# Patient Record
Sex: FEMALE | Race: WHITE | NOT HISPANIC OR LATINO | Employment: OTHER | URBAN - METROPOLITAN AREA
[De-identification: names, ages, dates, MRNs, and addresses within clinical notes are randomized per-mention and may not be internally consistent; named-entity substitution may affect disease eponyms.]

---

## 2021-09-23 ENCOUNTER — OFFICE VISIT (OUTPATIENT)
Dept: ENDOCRINOLOGY | Facility: CLINIC | Age: 66
End: 2021-09-23
Payer: MEDICARE

## 2021-09-23 ENCOUNTER — HOSPITAL ENCOUNTER (OUTPATIENT)
Dept: RADIOLOGY | Facility: CLINIC | Age: 66
Discharge: HOME | End: 2021-09-23
Attending: PHYSICIAN ASSISTANT
Payer: MEDICARE

## 2021-09-23 VITALS
BODY MASS INDEX: 37.34 KG/M2 | HEIGHT: 63 IN | WEIGHT: 210.75 LBS | DIASTOLIC BLOOD PRESSURE: 80 MMHG | OXYGEN SATURATION: 96 % | SYSTOLIC BLOOD PRESSURE: 124 MMHG | HEART RATE: 90 BPM | TEMPERATURE: 98 F

## 2021-09-23 DIAGNOSIS — E55.9 HYPOVITAMINOSIS D: ICD-10-CM

## 2021-09-23 DIAGNOSIS — L71.9 ROSACEA: ICD-10-CM

## 2021-09-23 DIAGNOSIS — Z78.0 POSTMENOPAUSAL: ICD-10-CM

## 2021-09-23 DIAGNOSIS — E66.9 OBESITY (BMI 35.0-39.9 WITHOUT COMORBIDITY): ICD-10-CM

## 2021-09-23 DIAGNOSIS — R53.83 FATIGUE, UNSPECIFIED TYPE: ICD-10-CM

## 2021-09-23 DIAGNOSIS — L67.8 ABNORMAL FACIAL HAIR: ICD-10-CM

## 2021-09-23 DIAGNOSIS — E65 BUFFALO HUMP: ICD-10-CM

## 2021-09-23 DIAGNOSIS — Z87.42 HISTORY OF PCOS: Primary | ICD-10-CM

## 2021-09-23 PROCEDURE — 77080 DXA BONE DENSITY AXIAL: CPT | Mod: 26,,, | Performed by: RADIOLOGY

## 2021-09-23 PROCEDURE — 99203 PR OFFICE/OUTPT VISIT, NEW, LEVL III, 30-44 MIN: ICD-10-PCS | Mod: S$PBB,,, | Performed by: PHYSICIAN ASSISTANT

## 2021-09-23 PROCEDURE — 99203 OFFICE O/P NEW LOW 30 MIN: CPT | Mod: PBBFAC,25,PO | Performed by: PHYSICIAN ASSISTANT

## 2021-09-23 PROCEDURE — 77080 DXA BONE DENSITY AXIAL: CPT | Mod: TC,PO

## 2021-09-23 PROCEDURE — 99999 PR PBB SHADOW E&M-NEW PATIENT-LVL III: CPT | Mod: PBBFAC,,, | Performed by: PHYSICIAN ASSISTANT

## 2021-09-23 PROCEDURE — 99999 PR PBB SHADOW E&M-NEW PATIENT-LVL III: ICD-10-PCS | Mod: PBBFAC,,, | Performed by: PHYSICIAN ASSISTANT

## 2021-09-23 PROCEDURE — 99203 OFFICE O/P NEW LOW 30 MIN: CPT | Mod: S$PBB,,, | Performed by: PHYSICIAN ASSISTANT

## 2021-09-23 PROCEDURE — 77080 DEXA BONE DENSITY SPINE HIP: ICD-10-PCS | Mod: 26,,, | Performed by: RADIOLOGY

## 2021-09-23 RX ORDER — BECLOMETHASONE DIPROPIONATE 80 UG/1
2 AEROSOL, METERED NASAL
COMMUNITY
Start: 2015-05-14 | End: 2021-11-24

## 2021-09-24 ENCOUNTER — LAB VISIT (OUTPATIENT)
Dept: LAB | Facility: HOSPITAL | Age: 66
End: 2021-09-24
Attending: PHYSICIAN ASSISTANT
Payer: MEDICARE

## 2021-09-24 ENCOUNTER — TELEPHONE (OUTPATIENT)
Dept: FAMILY MEDICINE | Facility: CLINIC | Age: 66
End: 2021-09-24

## 2021-09-24 DIAGNOSIS — E55.9 HYPOVITAMINOSIS D: ICD-10-CM

## 2021-09-24 DIAGNOSIS — E66.9 OBESITY (BMI 35.0-39.9 WITHOUT COMORBIDITY): ICD-10-CM

## 2021-09-24 DIAGNOSIS — R53.83 FATIGUE, UNSPECIFIED TYPE: ICD-10-CM

## 2021-09-24 DIAGNOSIS — Z83.3 FAMILY HISTORY OF DIABETES MELLITUS: ICD-10-CM

## 2021-09-24 DIAGNOSIS — Z87.42 HISTORY OF PCOS: ICD-10-CM

## 2021-09-24 DIAGNOSIS — E65 BUFFALO HUMP: ICD-10-CM

## 2021-09-24 DIAGNOSIS — L67.8 ABNORMAL FACIAL HAIR: ICD-10-CM

## 2021-09-24 LAB
25(OH)D3+25(OH)D2 SERPL-MCNC: 26 NG/ML (ref 30–96)
ALBUMIN SERPL BCP-MCNC: 4 G/DL (ref 3.5–5.2)
ALP SERPL-CCNC: 79 U/L (ref 55–135)
ALT SERPL W/O P-5'-P-CCNC: 15 U/L (ref 10–44)
ANION GAP SERPL CALC-SCNC: 13 MMOL/L (ref 8–16)
AST SERPL-CCNC: 16 U/L (ref 10–40)
BILIRUB SERPL-MCNC: 1.2 MG/DL (ref 0.1–1)
BUN SERPL-MCNC: 14 MG/DL (ref 8–23)
CALCIUM SERPL-MCNC: 10.5 MG/DL (ref 8.7–10.5)
CHLORIDE SERPL-SCNC: 103 MMOL/L (ref 95–110)
CHOLEST SERPL-MCNC: 153 MG/DL (ref 120–199)
CHOLEST/HDLC SERPL: 2.9 {RATIO} (ref 2–5)
CO2 SERPL-SCNC: 23 MMOL/L (ref 23–29)
CORTIS SERPL-MCNC: 13.1 UG/DL
CREAT SERPL-MCNC: 0.7 MG/DL (ref 0.5–1.4)
DHEA-S SERPL-MCNC: 39.9 UG/DL (ref 33.6–78.9)
EST. GFR  (AFRICAN AMERICAN): >60 ML/MIN/1.73 M^2
EST. GFR  (NON AFRICAN AMERICAN): >60 ML/MIN/1.73 M^2
GLUCOSE SERPL-MCNC: 126 MG/DL (ref 70–110)
HDLC SERPL-MCNC: 52 MG/DL (ref 40–75)
HDLC SERPL: 34 % (ref 20–50)
LDLC SERPL CALC-MCNC: 85.4 MG/DL (ref 63–159)
NONHDLC SERPL-MCNC: 101 MG/DL
POTASSIUM SERPL-SCNC: 4.1 MMOL/L (ref 3.5–5.1)
PROGEST SERPL-MCNC: 0.9 NG/ML
PROT SERPL-MCNC: 7.6 G/DL (ref 6–8.4)
SODIUM SERPL-SCNC: 139 MMOL/L (ref 136–145)
T4 FREE SERPL-MCNC: 0.84 NG/DL (ref 0.71–1.51)
TRIGL SERPL-MCNC: 78 MG/DL (ref 30–150)
TSH SERPL DL<=0.005 MIU/L-ACNC: 2.36 UIU/ML (ref 0.4–4)

## 2021-09-24 PROCEDURE — 82024 ASSAY OF ACTH: CPT | Performed by: PHYSICIAN ASSISTANT

## 2021-09-24 PROCEDURE — 84443 ASSAY THYROID STIM HORMONE: CPT | Performed by: PHYSICIAN ASSISTANT

## 2021-09-24 PROCEDURE — 80053 COMPREHEN METABOLIC PANEL: CPT | Performed by: PHYSICIAN ASSISTANT

## 2021-09-24 PROCEDURE — 83498 ASY HYDROXYPROGESTERONE 17-D: CPT | Performed by: PHYSICIAN ASSISTANT

## 2021-09-24 PROCEDURE — 82306 VITAMIN D 25 HYDROXY: CPT | Performed by: PHYSICIAN ASSISTANT

## 2021-09-24 PROCEDURE — 84439 ASSAY OF FREE THYROXINE: CPT | Performed by: PHYSICIAN ASSISTANT

## 2021-09-24 PROCEDURE — 84403 ASSAY OF TOTAL TESTOSTERONE: CPT | Performed by: PHYSICIAN ASSISTANT

## 2021-09-24 PROCEDURE — 36415 COLL VENOUS BLD VENIPUNCTURE: CPT | Mod: PO | Performed by: PHYSICIAN ASSISTANT

## 2021-09-24 PROCEDURE — 80061 LIPID PANEL: CPT | Performed by: PHYSICIAN ASSISTANT

## 2021-09-24 PROCEDURE — 82627 DEHYDROEPIANDROSTERONE: CPT | Performed by: PHYSICIAN ASSISTANT

## 2021-09-24 PROCEDURE — 84144 ASSAY OF PROGESTERONE: CPT | Performed by: PHYSICIAN ASSISTANT

## 2021-09-24 PROCEDURE — 82533 TOTAL CORTISOL: CPT | Performed by: PHYSICIAN ASSISTANT

## 2021-09-28 ENCOUNTER — TELEPHONE (OUTPATIENT)
Dept: FAMILY MEDICINE | Facility: CLINIC | Age: 66
End: 2021-09-28

## 2021-09-28 LAB — ACTH PLAS-MCNC: 18 PG/ML (ref 0–46)

## 2021-09-29 LAB
ESTRADIOL SERPL-MCNC: 19 PG/ML
ESTRONE SERPL-MCNC: 57 PG/ML

## 2021-09-30 LAB
17OHP SERPL-MCNC: 90 NG/DL (ref 32–272)
ALBUMIN SERPL-MCNC: 4.3 G/DL (ref 3.6–5.1)
SHBG SERPL-SCNC: 58 NMOL/L (ref 14–73)
TESTOST FREE SERPL-MCNC: 6.1 PG/ML (ref 0.2–5)
TESTOST SERPL-MCNC: 81 NG/DL (ref 2–45)
TESTOSTERONE.FREE+WB SERPL-MCNC: 12 NG/DL (ref 0.5–8.5)

## 2021-10-07 ENCOUNTER — PATIENT MESSAGE (OUTPATIENT)
Dept: ENDOCRINOLOGY | Facility: CLINIC | Age: 66
End: 2021-10-07

## 2021-10-07 DIAGNOSIS — R79.89 ELEVATED TESTOSTERONE LEVEL: Primary | ICD-10-CM

## 2021-10-24 ENCOUNTER — PATIENT MESSAGE (OUTPATIENT)
Dept: ENDOCRINOLOGY | Facility: CLINIC | Age: 66
End: 2021-10-24
Payer: MEDICARE

## 2021-10-25 DIAGNOSIS — R53.83 FATIGUE, UNSPECIFIED TYPE: Primary | ICD-10-CM

## 2021-10-25 DIAGNOSIS — R73.9 HYPERGLYCEMIA: Primary | ICD-10-CM

## 2021-11-18 ENCOUNTER — LAB VISIT (OUTPATIENT)
Dept: LAB | Facility: HOSPITAL | Age: 66
End: 2021-11-18
Attending: PHYSICIAN ASSISTANT
Payer: MEDICARE

## 2021-11-18 DIAGNOSIS — E55.9 HYPOVITAMINOSIS D: ICD-10-CM

## 2021-11-18 DIAGNOSIS — R73.9 HYPERGLYCEMIA: ICD-10-CM

## 2021-11-18 DIAGNOSIS — R79.89 ELEVATED TESTOSTERONE LEVEL: ICD-10-CM

## 2021-11-18 LAB
25(OH)D3+25(OH)D2 SERPL-MCNC: 59 NG/ML (ref 30–96)
ESTIMATED AVG GLUCOSE: 120 MG/DL (ref 68–131)
HBA1C MFR BLD: 5.8 % (ref 4–5.6)
PROGEST SERPL-MCNC: 0.2 NG/ML

## 2021-11-18 PROCEDURE — 83036 HEMOGLOBIN GLYCOSYLATED A1C: CPT | Performed by: PHYSICIAN ASSISTANT

## 2021-11-18 PROCEDURE — 84403 ASSAY OF TOTAL TESTOSTERONE: CPT | Performed by: PHYSICIAN ASSISTANT

## 2021-11-18 PROCEDURE — 84144 ASSAY OF PROGESTERONE: CPT | Performed by: PHYSICIAN ASSISTANT

## 2021-11-18 PROCEDURE — 82306 VITAMIN D 25 HYDROXY: CPT | Performed by: PHYSICIAN ASSISTANT

## 2021-11-18 PROCEDURE — 36415 COLL VENOUS BLD VENIPUNCTURE: CPT | Mod: PO | Performed by: PHYSICIAN ASSISTANT

## 2021-11-24 ENCOUNTER — OFFICE VISIT (OUTPATIENT)
Dept: DERMATOLOGY | Facility: CLINIC | Age: 66
End: 2021-11-24
Payer: MEDICARE

## 2021-11-24 DIAGNOSIS — L71.9 ROSACEA: ICD-10-CM

## 2021-11-24 DIAGNOSIS — L73.8 SEBACEOUS HYPERPLASIA: Primary | ICD-10-CM

## 2021-11-24 PROCEDURE — 99999 PR PBB SHADOW E&M-EST. PATIENT-LVL III: ICD-10-PCS | Mod: PBBFAC,,, | Performed by: STUDENT IN AN ORGANIZED HEALTH CARE EDUCATION/TRAINING PROGRAM

## 2021-11-24 PROCEDURE — 99204 PR OFFICE/OUTPT VISIT, NEW, LEVL IV, 45-59 MIN: ICD-10-PCS | Mod: S$PBB,,, | Performed by: STUDENT IN AN ORGANIZED HEALTH CARE EDUCATION/TRAINING PROGRAM

## 2021-11-24 PROCEDURE — 99999 PR PBB SHADOW E&M-EST. PATIENT-LVL III: CPT | Mod: PBBFAC,,, | Performed by: STUDENT IN AN ORGANIZED HEALTH CARE EDUCATION/TRAINING PROGRAM

## 2021-11-24 PROCEDURE — 99213 OFFICE O/P EST LOW 20 MIN: CPT | Mod: PBBFAC,PO | Performed by: STUDENT IN AN ORGANIZED HEALTH CARE EDUCATION/TRAINING PROGRAM

## 2021-11-24 PROCEDURE — 99204 OFFICE O/P NEW MOD 45 MIN: CPT | Mod: S$PBB,,, | Performed by: STUDENT IN AN ORGANIZED HEALTH CARE EDUCATION/TRAINING PROGRAM

## 2021-11-24 RX ORDER — AZELAIC ACID 0.15 G/G
GEL TOPICAL
Qty: 50 G | Refills: 3 | Status: SHIPPED | OUTPATIENT
Start: 2021-11-24 | End: 2023-03-09 | Stop reason: ALTCHOICE

## 2021-11-25 LAB
ALBUMIN SERPL-MCNC: 4.4 G/DL (ref 3.6–5.1)
SHBG SERPL-SCNC: 47 NMOL/L (ref 14–73)
TESTOST FREE SERPL-MCNC: 4.1 PG/ML (ref 0.2–5)
TESTOST SERPL-MCNC: 46 NG/DL (ref 2–45)
TESTOSTERONE.FREE+WB SERPL-MCNC: 8.2 NG/DL (ref 0.5–8.5)

## 2021-12-01 ENCOUNTER — PATIENT MESSAGE (OUTPATIENT)
Dept: DERMATOLOGY | Facility: CLINIC | Age: 66
End: 2021-12-01
Payer: MEDICARE

## 2021-12-07 DIAGNOSIS — R53.83 FATIGUE, UNSPECIFIED TYPE: Primary | ICD-10-CM

## 2022-04-19 ENCOUNTER — LAB VISIT (OUTPATIENT)
Dept: LAB | Facility: HOSPITAL | Age: 67
End: 2022-04-19
Attending: DERMATOLOGY
Payer: MEDICARE

## 2022-04-19 DIAGNOSIS — Z67.91 UNSPECIFIED BLOOD TYPE, RH NEGATIVE: ICD-10-CM

## 2022-04-19 DIAGNOSIS — Z79.899 ENCOUNTER FOR LONG-TERM (CURRENT) USE OF HIGH-RISK MEDICATION: ICD-10-CM

## 2022-04-19 LAB — ABO + RH BLD: NORMAL

## 2022-04-19 PROCEDURE — 86901 BLOOD TYPING SEROLOGIC RH(D): CPT | Performed by: DERMATOLOGY

## 2022-04-19 PROCEDURE — 36415 COLL VENOUS BLD VENIPUNCTURE: CPT | Mod: PO | Performed by: DERMATOLOGY

## 2022-05-16 ENCOUNTER — OFFICE VISIT (OUTPATIENT)
Dept: OTOLARYNGOLOGY | Facility: CLINIC | Age: 67
End: 2022-05-16
Payer: MEDICARE

## 2022-05-16 VITALS — TEMPERATURE: 99 F | WEIGHT: 203.06 LBS | BODY MASS INDEX: 35.98 KG/M2 | HEIGHT: 63 IN

## 2022-05-16 DIAGNOSIS — J34.3 HYPERTROPHY OF BOTH INFERIOR NASAL TURBINATES: ICD-10-CM

## 2022-05-16 DIAGNOSIS — J34.89 NASAL OBSTRUCTION: Primary | ICD-10-CM

## 2022-05-16 DIAGNOSIS — J34.2 NASAL SEPTAL DEVIATION: ICD-10-CM

## 2022-05-16 DIAGNOSIS — M95.0 NASAL VALVE COLLAPSE: ICD-10-CM

## 2022-05-16 DIAGNOSIS — R44.2 PHANTOSMIA: ICD-10-CM

## 2022-05-16 PROCEDURE — 99999 PR PBB SHADOW E&M-EST. PATIENT-LVL III: ICD-10-PCS | Mod: PBBFAC,,, | Performed by: PHYSICIAN ASSISTANT

## 2022-05-16 PROCEDURE — 99203 PR OFFICE/OUTPT VISIT, NEW, LEVL III, 30-44 MIN: ICD-10-PCS | Mod: S$PBB,25,, | Performed by: PHYSICIAN ASSISTANT

## 2022-05-16 PROCEDURE — 99213 OFFICE O/P EST LOW 20 MIN: CPT | Mod: PBBFAC,PO,25 | Performed by: PHYSICIAN ASSISTANT

## 2022-05-16 PROCEDURE — 31231 NASAL ENDOSCOPY DX: CPT | Mod: S$PBB,,, | Performed by: PHYSICIAN ASSISTANT

## 2022-05-16 PROCEDURE — 99203 OFFICE O/P NEW LOW 30 MIN: CPT | Mod: S$PBB,25,, | Performed by: PHYSICIAN ASSISTANT

## 2022-05-16 PROCEDURE — 31231 NASAL/SINUS ENDOSCOPY: ICD-10-PCS | Mod: S$PBB,,, | Performed by: PHYSICIAN ASSISTANT

## 2022-05-16 PROCEDURE — 31231 NASAL ENDOSCOPY DX: CPT | Mod: PBBFAC,PO | Performed by: PHYSICIAN ASSISTANT

## 2022-05-16 PROCEDURE — 99999 PR PBB SHADOW E&M-EST. PATIENT-LVL III: CPT | Mod: PBBFAC,,, | Performed by: PHYSICIAN ASSISTANT

## 2022-05-16 RX ORDER — ALPHA LIPOIC ACID 300 MG
300 CAPSULE ORAL 2 TIMES DAILY
Qty: 180 EACH | Refills: 0 | Status: SHIPPED | OUTPATIENT
Start: 2022-05-16 | End: 2022-08-14

## 2022-05-16 NOTE — PATIENT INSTRUCTIONS
Use NeilMed sinus rinse two times daily.          DIRECTIONS FOR SINUS SALINE RINSE To see demonstration: Enter http://www.youOrlumetube.com/watch?v=[email or web address] into the browser address box, or go to You tube, and under the search box, enter sinus rinse. Click on NeilMed Sinus Rinse Video    Step 1    Step 1 Please wash your hands. Fill the clean bottle with the designated volume of warm distilled water, filtered water or previously boiled water. You may warm the water in a microwave but we recommend that you warm it in increments of five seconds. This is to avoid excessive heating of the water and damage to the device or scalding your nasal passage.    Step 2    Step 2 Cut the SINUS RINSE mixture packet at the corner and pour its contents into the bottle. Tighten the cap & tube on the bottle securely, place one finger over the tip of the cap and shake the bottle gently to dissolve the mixture.      Step 3    Step 3 Standing in front of a sink, bend forward to your comfort level and tilt your head down. Keeping your mouth open without holding your breath, place cap snugly against your nasal passage and SQUEEZE BOTTLE GENTLY until the solution starts draining from the OPPOSITE nasal passage or from your mouth. Keep squeezing the bottle GENTLY until at least 1/4 to 1/2 (60 to 120 mL) of the bottle is used for a proper rinse. Do not swallow the solution.    Step 4    Blow your nose gently, without pinching your nose completely because this will apply pressure on the eardrums. If tolerable, sniff in any residual solution remaining in the nasal passage once or twice prior to blowing your nose as this may clean out the posterior nasopharyngeal area (the area at the back of your nasal passage). Some solution will reach the back of the throat, so please spit it out. To help improve drainage of any residual solution, blow your nose gently while tilting your head to the opposite side of the nasal passage that you just  rinsed.    Step 5    Now repeat steps 3 & 4 for your other nasal passage.    Step 6     Air dry the Sinus Rinse bottle, cap, and tube on a clean paper towel, a glass plate to store the bottle cap and tube. If there is any solution leftover, please discard it. We recommend you make a fresh solution each time you rinse. Rinse 5 times each day, OR as directed by your physician. Warnings: DO NOT RINSE IF NASAL PASSAGE IS COMPLETELY BLOCKED OR IF YOU HAVE AN EAR INFECTION OR BLOCKED EARS. If you have had ear surgery, please contact your physician prior to irrigation. If you experience any pressure in your ears, stop the rinse and get further directions from your physician or contact our office during regular business hours. To avoid any ear discomfort: Heat the solution to lukewarm, do not use hot, boiling or cold water. Keep your mouth open. Do not hold your breath and if possible make the sound SANCHEZ....SANCHEZ... Make sure to take the position as shown. Gently squeeze 1/4 of the bottle at a time (60mL / 2 ounces). Stop the rinse if you feel any solution sensation near the ears. You may rinse with a partially blocked nasal passage. Please do not use for any other purposes. Please rinse at least ONE HOUR PRIOR to bedtime, in order to avoid any residual solution dripping in the throat.    >> Before using the SINUS RINSE kit, please inspect the cap, tube and bottle carefully for wear and tear. If any of the components appear discolored or cracked, please contact [facility] to obtain a replacement. You must follow the cleaning instructions provided in this brochure or cleaning instruction card prior to each use.    >> The SINUS RINSE bottle and mixture are to be used only for nasalirrigation. Do not use for any other purposes.    >> We recommend that you use the rinse ONE HOUR PRIOR to bedtime in order to avoid any residual solution dripping in the throat.    Tips to avoid ear discomfort while rinsing    If you have had ear  surgery, please contact your physician prior to irrigation. Do not use if you have an ear infection or blocked ears. Rinse with lukewarm water. Keep your mouth open. Do not hold your breath while rinsing. While rinsing, make sure to tilt your. Gently squeeze the bottle while rinsing; do not squeeze the bottle very forcefully. Stop the rinse if you feel a sensation of fluid near your ears.    Tips to avoid unexpected drainage after rinsing    In rare situations, especially if you have had sinus surgery, the saline solution can pool in the sinus cavities and nasal passages and then drip from your nostrils hours after rinsing. To avoid this harmless but annoying inconvenience, take one extra step after rinsing: lean forward, tilt your head sideways and gently blow your nose. Then, tilt your head to the other side and blow again. You may need to repeat this several times. This will help rid your nasal passages of any excess mucus and remaining saline solution. If you find yourself experiencing delayed drainage often, do not rinse right before leaving your house or going to bed.

## 2022-05-16 NOTE — PROGRESS NOTES
[facility]    Subjective:      Patient: Keshia Son Patient PCP: Primary Doctor No         :  1955     Sex:  female      MRN:  [id number]          Date of Visit: 2022      Chief Complaint: Nasal congestion    Patient ID: Keshia Son is a 66 y.o. female who was self-referred for sinus issues. Pt states she has history of environmental allergies, but does not use oral antihistamine due to them making her drowsy. Pt states she does have chronic nasal obstructive symptoms. Pt endorses h/o hyposmia for over a year which has been helped with treatment of vitamin D deficiency. Pt does state that she has occasional phantosmia with smell of ammonia/chemical smell. Pt states that this is intermittent and happens when she lays down-pt states that this happens daily. Pt states that her main complaint today is nasal congestion. Pt denies fever/chills, PND, rhinorrhea, or sinus pain/pressure. Pt states that she is not currently on sinus medications. She does not recall previously having allergy testing. She denies a history of asthma. She denies a diagnosis of obstructive sleep apnea. She has not had sinonasal surgery. She has not had a tonsillectomy. She does not recall a prior history of nasal trauma. Pt deneis memory issues or gait instability. Pt denies h/o alzheimer's or parkinson's disease.     Review of Systems   Constitutional: Negative for chills and fever.   HENT: Positive for congestion. Negative for postnasal drip, rhinorrhea, sinus pressure and sinus pain.         Smell changes      Past Medical History  She has a past medical history of Allergies and Polycystic ovaries.    Family History  Her family history includes Stroke in her mother.    Past Surgical History:   Procedure Laterality Date    BREAST REVISION SURGERY  2021    GASTRIC RESTRICTION SURGERY      [place]    REDUCTION OF BOTH BREASTS      THIGH LIFT          TOTAL BODY LIFT      2007    tummy tuck  2021      Social History     Tobacco Use    Smoking status: Never Smoker    Smokeless tobacco: Never Used   Substance and Sexual Activity    Alcohol use: Yes     Alcohol/week: 2.0 standard drinks     Types: 2 Glasses of wine per week     Comment: holidays    Drug use: Never    Sexual activity: Not Currently     Medications  She has a current medication list which includes the following prescription(s): alpha lipoic acid, azelaic acid, cefuroxime, finasteride, hydrocodone-acetaminophen, and spironolactone.    Review of patient's allergies indicates:  No Known Allergies  All medications, allergies, and past history have been reviewed.    Objective:      Vitals:  Vitals - 1 value per visit 11/24/2021 5/16/2022 5/16/2022   SYSTOLIC - - -   DIASTOLIC - - -   Pulse - - -   Temp - - 98.5   SPO2 - - -   Weight (lb) - - 203.04   Weight (kg) - - 92.1   Height - - 63   BMI (Calculated) - - 36   VISIT REPORT - - -   Pain Score  0 0 -       Body surface area is 2.02 meters squared.  Physical Exam  Constitutional:       General: She is not in acute distress.     Appearance: Normal appearance. She is not ill-appearing.   HENT:      Head: Normocephalic and atraumatic.      Right Ear: Tympanic membrane, ear canal and external ear normal.      Left Ear: Tympanic membrane, ear canal and external ear normal.      Nose: Septal deviation (right) present.      Right Turbinates: Enlarged.      Left Turbinates: Enlarged.      Right Sinus: No maxillary sinus tenderness or frontal sinus tenderness.      Left Sinus: No maxillary sinus tenderness or frontal sinus tenderness.      Mouth/Throat:      Lips: Pink. No lesions.      Mouth: Mucous membranes are moist. No oral lesions.      Tongue: No lesions.      Palate: No lesions.      Pharynx: Oropharynx is clear. Uvula midline. No pharyngeal swelling, oropharyngeal exudate, posterior oropharyngeal erythema or uvula swelling.   Eyes:      General:         Right eye: No discharge.         Left eye:  No discharge.      Extraocular Movements: Extraocular movements intact.      Conjunctiva/sclera: Conjunctivae normal.   Pulmonary:      Effort: Pulmonary effort is normal.   Neurological:      General: No focal deficit present.      Mental Status: She is alert and oriented to person, place, and time. Mental status is at baseline.   Psychiatric:         Mood and Affect: Mood normal.         Behavior: Behavior normal.         Thought Content: Thought content normal.         Judgment: Judgment normal.       Nasal/sinus endoscopy     Date/Time: 5/16/2022 1:00 PM  Performed by: Arpit Bobby PA-C  Authorized by: Arpit Bobby PA-C      Consent Done?:  Yes (Verbal)  Anesthesia:     Local anesthetic:  Lidocaine 4% and Samuel-Synephrine 1/2%    Location: bilateral nares.    Type of Endoscope:  Flexible    Patient tolerance:  Patient tolerated the procedure well with no immediate complications  Nose:     Procedure Performed:  Nasal Endoscopy  External:      External nasal deformity (bilateral nasal valve collapse)  Intranasal:      Mucosa no polyps     Mucosa ulcers not present     No mucosa lesions present     Enlarged turbinates (moderate inferior nasal turbinate hypertrophy bilaterally with mild middle nasal turbinate hypertrophy bilaterally)     Septum gross deformity (right nasal septal deviation)  Nasopharynx:      No mucosa lesions     Posterior choanae patent     Eustachian tube patent     Labs:  Glucose   Date Value Ref Range Status   09/24/2021 126 (H) 70 - 110 mg/dL Final     All lab results, imaging results, and data have been reviewed.    Assessment:        ICD-10-CM ICD-9-CM   1. Nasal obstruction  J34.89 478.19   2. Hypertrophy of both inferior nasal turbinates  J34.3 478.0   3. Hyposmia with Phantosmia  R44.2 780.1   4. Nasal valve collapse  J34.89 478.19   5. Nasal septal deviation  J34.2 470            Plan:      I had discussion today with pt regarding nasal obstructive symptoms and  phantosmia. Please see nasal endoscopy procedure in note above. Pt advised that she has hypertrophy of nasal turbinates (moderate inferior nasal turbinate hypertrophy bilaterally-noted to be more severe upon anterior rhinoscopy prior to decongestant spray for nasal endoscopy-with mild middle turbinate hypertrophy bilaterally) with right nasal septal deviation and bilateral nasal valve collapse (with breathing helped with Gordon maneuver). Pt advised that the above findings can cause obstructive symptoms. Breathe right strips can be used to help with nasal valve collapse. Pt reports history of anosmia for over a year that has been helped with vitamin D supplementation for vitamin D deficiency per pt. Pt has episodes of phantosmia noticed more at night with laying down with pt noting a chemical/ammonia smell. Pt is to start alpha lipoic acid 300 mg Cap; Take 300 mg by mouth 2 (two) times daily for 3 months to help with hyposmia/phantosmia symptoms. Pt advised to use fátima med sinus rinse twice daily to also help with smell change. Pt has been given olfactory training handout to do for 3 months to help with smell change. Pt is to proceed with CT medtronic sinus WO to further assess hyposmia/phantosmia symptoms and also for further evaluation of nasal obstructive findings as noted above. Our office will reach out to pt with CT sinus results and recommendations and will have pt follow up with ENT MD Dr. Cholo Zeng for surgical vs conservative recommendations for nasal obstruction. Pt may follow up in clinic if she has persistent or worsening symptoms or ENT issues.

## 2022-05-19 ENCOUNTER — HOSPITAL ENCOUNTER (OUTPATIENT)
Dept: RADIOLOGY | Facility: HOSPITAL | Age: 67
Discharge: HOME | End: 2022-05-19
Attending: PHYSICIAN ASSISTANT
Payer: MEDICARE

## 2022-05-19 DIAGNOSIS — R44.2 PHANTOSMIA: ICD-10-CM

## 2022-05-19 DIAGNOSIS — J34.2 NASAL SEPTAL DEVIATION: ICD-10-CM

## 2022-05-19 DIAGNOSIS — J34.89 NASAL OBSTRUCTION: ICD-10-CM

## 2022-05-19 PROCEDURE — 70486 CT MAXILLOFACIAL W/O DYE: CPT | Mod: 26,,, | Performed by: RADIOLOGY

## 2022-05-19 PROCEDURE — 70486 CT MEDTRONIC SINUSES WITHOUT: ICD-10-PCS | Mod: 26,,, | Performed by: RADIOLOGY

## 2022-05-19 PROCEDURE — 70486 CT MAXILLOFACIAL W/O DYE: CPT | Mod: TC

## 2022-05-20 NOTE — PROCEDURES
Nasal/sinus endoscopy    Date/Time: 5/16/2022 1:00 PM  Performed by: Arpit Bobby PA-C  Authorized by: Arpit Bobby PA-C     Consent Done?:  Yes (Verbal)  Anesthesia:     Local anesthetic:  Lidocaine 4% and Samuel-Synephrine 1/2%    Location: bilateral nares.    Type of Endoscope:  Flexible    Patient tolerance:  Patient tolerated the procedure well with no immediate complications  Nose:     Procedure Performed:  Nasal Endoscopy  External:      External nasal deformity (bilateral nasal valve collapse)  Intranasal:      Mucosa no polyps     Mucosa ulcers not present     No mucosa lesions present     Enlarged turbinates (moderate inferior nasal turbinate hypertrophy bilaterally with mild middle nasal turbinate hypertrophy bilaterally)     Septum gross deformity (right nasal septal deviation)  Nasopharynx:      No mucosa lesions     Posterior choanae patent     Eustachian tube patent

## 2022-05-23 ENCOUNTER — TELEPHONE (OUTPATIENT)
Dept: OTOLARYNGOLOGY | Facility: CLINIC | Age: 67
End: 2022-05-23
Payer: MEDICARE

## 2022-05-23 NOTE — TELEPHONE ENCOUNTER
Contacted and spoke to pt. Reviewed the following result note w/pt per provider. Pt verbalized understanding and acknowledged. Pt did want to proceed with scheduling appt with  for discussion of possibility of septoplasty w/turbinate reduction.       ----- Message from Arpit Bobby PA-C sent at 5/20/2022  3:23 PM CDT -----  Please tell pt that her sinus CT is remarkable for right sided septal deviation along with turbinate hypertrophy. Please tell pt that I would like her to follow up with ENT MD Dr. Cholo Zeng for discussion of possibility of septoplasty with turbinate reduction to help with nasal obstructive symptoms. Thank you

## 2022-06-02 ENCOUNTER — OFFICE VISIT (OUTPATIENT)
Dept: OTOLARYNGOLOGY | Facility: CLINIC | Age: 67
End: 2022-06-02
Payer: MEDICARE

## 2022-06-02 VITALS — WEIGHT: 203.5 LBS | BODY MASS INDEX: 36.06 KG/M2 | HEIGHT: 63 IN | TEMPERATURE: 99 F

## 2022-06-02 DIAGNOSIS — R43.8 HYPOSMIA: ICD-10-CM

## 2022-06-02 DIAGNOSIS — R44.2 PHANTOSMIA: Primary | ICD-10-CM

## 2022-06-02 DIAGNOSIS — R09.82 POST-NASAL DRIP: ICD-10-CM

## 2022-06-02 DIAGNOSIS — J34.2 NASAL SEPTAL DEVIATION: ICD-10-CM

## 2022-06-02 DIAGNOSIS — J34.3 HYPERTROPHY OF BOTH INFERIOR NASAL TURBINATES: ICD-10-CM

## 2022-06-02 PROCEDURE — 99214 PR OFFICE/OUTPT VISIT, EST, LEVL IV, 30-39 MIN: ICD-10-PCS | Mod: S$PBB,,, | Performed by: OTOLARYNGOLOGY

## 2022-06-02 PROCEDURE — 99213 OFFICE O/P EST LOW 20 MIN: CPT | Mod: PBBFAC,PO | Performed by: OTOLARYNGOLOGY

## 2022-06-02 PROCEDURE — 99999 PR PBB SHADOW E&M-EST. PATIENT-LVL III: CPT | Mod: PBBFAC,,, | Performed by: OTOLARYNGOLOGY

## 2022-06-02 PROCEDURE — 99999 PR PBB SHADOW E&M-EST. PATIENT-LVL III: ICD-10-PCS | Mod: PBBFAC,,, | Performed by: OTOLARYNGOLOGY

## 2022-06-02 PROCEDURE — 99214 OFFICE O/P EST MOD 30 MIN: CPT | Mod: S$PBB,,, | Performed by: OTOLARYNGOLOGY

## 2022-06-02 RX ORDER — TRIAMCINOLONE ACETONIDE 55 UG/1
2 SPRAY, METERED NASAL DAILY
Qty: 16.9 ML | Refills: 5 | Status: SHIPPED | OUTPATIENT
Start: 2022-06-02 | End: 2023-05-04

## 2022-06-02 NOTE — PROGRESS NOTES
[facility]    Subjective:      Patient: Keshia Son Patient PCP: Primary Doctor No         :  1955     Sex:  female      MRN:  [id number]          Date of Visit: 2022      Chief Complaint: Advice Only (Discuss possibility of septoplasty w/turbinate reduction)      Patient ID: Keshia Son is a 66 y.o. female lifelong NON-smoker with pre diabetes with a hemoglobin A1c of 5.8% and normal thyroid function with a TSH of 2.361 referred by my colleague PADMINI Douglas with a subjective history of allergy for which she has taken antihistamines but not nasal steroids (QNASL prescribed in ) and physical exam findings of inferior turbinate hypertrophy and mild septal deviation with external nasal valve collapse on exam and endoscopy with PADMINI Douglas at her visit 2022.  Concern of parosmias hyposmia noted in started on alpha lipoic acid.  High-resolution CT scan of the sinuses ordered.  Images reviewed today as well as radiology report reflect some angulated septal deviation to the patient's right side with agenesis of the left frontal sinus and no mucoperiosteal thickening polyps fluid levels destructive process or osteitis consistent with any chronic inflammatory or recurrent sinusitis.     Patient tried nasal steroids in the past and felt like they gave her sore throat.  Even nonsedating antihistamines tend to sedate her.  She was counseled to use saline sinus rinses at her last visit but has not yet started them.  She did start the alpha lipoic acid just 2 or 3 weeks ago right after her last visit.  She has had no other central neurologic symptoms.  No known COVID infection or exposure.  Her sense of smell was decreased years ago before the COVID pandemic began.  She has right greater than left-sided nasal congestion which is not typically obstructive but she does have some nighttime mouth breathing and dry mouth associated.  She started olfactory retraining but found the  essential oils she was using to be bothersome causing headache.  She was told she had recurrence sinusitis in the past with symptoms of frontal another sinus distribution pain being her main diagnostic symptom of sinusitis.  She has no known history of migraine.    Review of Systems   Constitutional: Negative.    HENT: Positive for congestion.    Eyes: Negative.    Respiratory: Negative.    Cardiovascular: Negative.    Gastrointestinal: Negative.    Endocrine: Negative.    Genitourinary: Negative.    Musculoskeletal: Negative.    Allergic/Immunologic: Negative.    Neurological: Negative.    Hematological: Negative.    Psychiatric/Behavioral: Negative.         Past Medical History  She has a past medical history of Allergies and Polycystic ovaries.    Family / Surgical / Social History  Her family history includes Stroke in her mother.    Past Surgical History:   Procedure Laterality Date    BREAST REVISION SURGERY  07/2021    GASTRIC RESTRICTION SURGERY  1990    [place]    REDUCTION OF BOTH BREASTS  2010    THIGH LIFT      2010    TOTAL BODY LIFT      2007    tummy tuck  07/2021       Social History     Tobacco Use    Smoking status: Never Smoker    Smokeless tobacco: Never Used   Substance and Sexual Activity    Alcohol use: Yes     Alcohol/week: 2.0 standard drinks     Types: 2 Glasses of wine per week     Comment: holidays    Drug use: Never    Sexual activity: Not Currently       Medications  She has a current medication list which includes the following prescription(s): alpha lipoic acid, azelaic acid, cefuroxime, finasteride, hydrocodone-acetaminophen, and spironolactone.      Allergies  Review of patient's allergies indicates:  No Known Allergies    All medications, allergies, and past history have been reviewed.    Objective:      Vitals:  Vitals - 1 value per visit 11/24/2021 5/16/2022 5/16/2022   SYSTOLIC - - -   DIASTOLIC - - -   Pulse - - -   Temp - - 98.5   SPO2 - - -   Weight (lb) - -  203.04   Weight (kg) - - 92.1   Height - - 63   BMI (Calculated) - - 36   VISIT REPORT - - -   Pain Score  0 0 -       There is no height or weight on file to calculate BSA.    Physical Exam:    GENERAL  APPEARANCE -  alert, appears stated age, cooperative and moderately obese  BARRIER(S) TO COMMUNICATION -  none VOICE - appropriate for age and gender    INTEGUMENTARY  no suspicious head and neck lesions    HEENT  HEAD: Normocephalic, without obvious abnormality, atraumatic  FACE: INSPECTION - Symmetric, no signs of trauma, no suspicious lesion(s)  PALPATION -  No masses SALIVARY GLANDS - non-tender with no appreciable mass  STRENGTH - facial symmetry  NECK/THYROID: normal atraumatic, no neck masses, normal thyroid, no jvd    EYES  Normal occular alignment and mobility with no visible nystagmus at rest    EARS/NOSE/MOUTH/THROAT  EARS  PINNAE AND EXTERNAL EARS - no suspicious lesion OTOSCOPIC EXAM (surgical microscopy was not used for visualization/instrumentation): EAR EXAM - Normal ear canals, tympanic membranes and mobility, and middle ear spaces bilaterally.  HEARING - grossly intact to voice/finger rub    NOSE AND SINUSES  EXTERNAL NOSE - Grossly normal for age/sex  SEPTUM - RIGHT >75% deviation TURBINATES - left > right turbinate hypertrophy of 3+ and 2+ MUCOSA - minimally pale to cyanotic, not edematous     MOUTH AND THROAT   ORAL CAVITY, LIPS, TEETH, GUMS & TONGUE - moist, no suspicious lesions  OROPHARYNX /TONSILS/PHARYNGEAL WALLS/HYPOPHARYNX - no erythema or exudates  NASOPHARYNX - limited mirror exam - unable to visualize due to anatomy/gag  LARYNX -  - limited mirror exam - unable to visualize due to anatomy/gag      CHEST AND LUNG   INSPECTION & AUSCULTATION - normal effort, no stridor    CARDIOVASCULAR  AUSCULTATION & PERIPHERAL VASCULAR - regular rate and rhythm.    NEUROLOGIC  MENTAL STATUS - alert, interactive CRANIAL NERVES - normal    LYMPHATIC  HEAD AND NECK - non-palpable; SUPRACLAVICULAR -  deferred; AXILLARY - deferred; INGUINAL - deferred; LIVER/SPLEEN - deferred        Procedure(s):  None    Labs:  Creatinine   Date Value Ref Range Status   09/24/2021 0.7 0.5 - 1.4 mg/dL Final     TSH   Date Value Ref Range Status   09/24/2021 2.361 0.400 - 4.000 uIU/mL Final     Glucose   Date Value Ref Range Status   09/24/2021 126 (H) 70 - 110 mg/dL Final     Hemoglobin A1C   Date Value Ref Range Status   11/18/2021 5.8 (H) 4.0 - 5.6 % Final     Comment:     ADA Screening Guidelines:  5.7-6.4%  Consistent with prediabetes  >or=6.5%  Consistent with diabetes    High levels of fetal hemoglobin interfere with the HbA1C  assay. Heterozygous hemoglobin variants (HbS, HgC, etc)do  not significantly interfere with this assay.   However, presence of multiple variants may affect accuracy.           Assessment:      Problem List Items Addressed This Visit        Neuro    Hyposmia       ENT    Nasal septal deviation    Hypertrophy of both inferior nasal turbinates    Post-nasal drip       Other    Phantosmia - Primary               Plan:      Start NeilMed sinus rinses as previously recommended an use lots of saline throughout the day.  Restart a different nasal steroid than those in the past with hopes of not creating sore throat experienced before.  In order to minimize this potential side effect use as little of the nasal steroid as possible to alleviate symptoms of nasal congestion and hopefully improve better smell function.    Spray 1 spray towards the ear each side twice daily decrease if bothersome.  Continue with alpha lipoic acid as started 2 weeks ago for 3 full months before determining there is no benefit in terms of smell function.    Surgery for septal deviation and turbinate hypertrophy not recommended at this time but of nasal obstructive symptoms persist outpatient surgery as discussed briefly today should be discussed further and pursued.  As discussed nasal surgery for decreased sense of smell and  postnasal drip may prove unsatisfactory, but for nasal congestion obstruction is often quite beneficial.    Continue with olfactory retraining as discussed with more natural sources of sent such as citrus peel (fresh), known spices such as close and cinnamon, coffee grinds etc.  visualizing the smell while using them several times a day rather than the essential oils which seem to be triggering the trigeminal nerve system and creating headache.    There is no evidence of any destructive process or abnormal finding on the CT scan, but, if smell function worsens or fails to improve MRI scanning of the brain to rule out the unlikely possibility of a lesion of the olfactory perhaps ways is recommended.

## 2022-06-02 NOTE — PATIENT INSTRUCTIONS
Start [facility] sinus rinses as previously recommended an use lots of saline throughout the day.  Restart a different nasal steroid than those in the past with hopes of not creating sore throat experienced before.  In order to minimize this potential side effect use as little of the nasal steroid as possible to alleviate symptoms of nasal congestion and hopefully improve better smell function.    Spray 1 spray towards the ear each side twice daily decrease if bothersome.  Continue with alpha lipoic acid as started 2 weeks ago for 3 full months before determining there is no benefit in terms of smell function.    Surgery for septal deviation and turbinate hypertrophy not recommended at this time but of nasal obstructive symptoms persist outpatient surgery as discussed briefly today should be discussed further and pursued.  As discussed nasal surgery for decreased sense of smell and postnasal drip may prove unsatisfactory, but for nasal congestion obstruction is often quite beneficial.    Continue with olfactory retraining as discussed with more natural sources of sent such as citrus peel (fresh), known spices such as close and cinnamon, coffee grinds etc.  visualizing the smell while using them several times a day rather than the essential oils which seem to be triggering the trigeminal nerve system and creating headache.    There is no evidence of any destructive process or abnormal finding on the CT scan, but, if smell function worsens or fails to improve MRI scanning of the brain to rule out the unlikely possibility of a lesion of the olfactory perhaps ways is recommended.      NASAL SALINE    Still saline comes in many preparations including sprays/mists, gels, and rinses.  Different preparations served different purposes.  Saline spray helps to briefly moisturize the nose and help clear mucus.  Saline gels coat the nose for longer protective benefit of keeping the linings the nose moist.  Saline rinses clear the  nose and sinuses and a more thorough way in her best used for significant postnasal drip and sinus complaints.  A combination of saline sprays/mists, gels and rinses should be used to address routine nasal clearing and dryness issues as well as flushing for better control of allergy and postnasal drip symptoms.  There is no real risk of over use of nasal saline products.  Saline sprays do not have any of the potential rebound or addiction of nasal decongestant sprays.  Nasal saline sprays and rinses should be used prior to the application of any medicated nasal sprays such as nasal steroids or nasal antihistamine sprays.        INTRANASAL STEROID SPRAYS      Intranasal steroid sprays are available both by prescription and over-the-counter both in generic and brand name preparations.  They are all very similar in efficacy and side effect profiles.  Over-the-counter and prescription intranasal steroids include fluticasone propionate (Flonase), fluticasone furoate (Sensimist), triamcinolone (Nasacort), and budesonide (Rhinocort).  While these medications are available as prescriptions as well there are few nasal steroids in her available by prescription only and include mometasone (Nasonex), flunisolide (Nasarel), and beclomethasone (QNASL).    Nasal steroids or the foundation of treatment of both allergy and other inflammatory conditions of the nose and sinuses.  They are safe for regular use and while there are many side effects listed most of these are steroid class effects and not typically encountered.  Typical side effects include dryness and even ulceration and bleeding of the nose.  These side effects can be minimized by proper application and proper moisturization with saline and saline gel.    Sometimes changing between 1 brand of nasal steroid and another can result in improved control of symptoms especially after long term use of one particular nasal steroid.    Proper application of the nasal steroid  spray is accomplished by spraying towards the I/ear on the same side with the tip of the superior just barely inside the nostril with the chin slightly downward.  Any dripping should be gently inhaled not sniff test backwards into the throat.  Labeled instructions should be followed.

## 2022-06-17 ENCOUNTER — LAB VISIT (OUTPATIENT)
Dept: LAB | Facility: HOSPITAL | Age: 67
End: 2022-06-17
Attending: DERMATOLOGY
Payer: MEDICARE

## 2022-06-17 DIAGNOSIS — N28.9 ACUTE RENAL DISEASE: ICD-10-CM

## 2022-06-17 DIAGNOSIS — Z51.81 THERAPEUTIC DRUG MONITORING: ICD-10-CM

## 2022-06-17 LAB
ALBUMIN SERPL BCP-MCNC: 4.1 G/DL (ref 3.5–5.2)
ALP SERPL-CCNC: 73 U/L (ref 55–135)
ALT SERPL W/O P-5'-P-CCNC: 14 U/L (ref 10–44)
ANION GAP SERPL CALC-SCNC: 11 MMOL/L (ref 8–16)
AST SERPL-CCNC: 18 U/L (ref 10–40)
BILIRUB SERPL-MCNC: 0.7 MG/DL (ref 0.1–1)
BUN SERPL-MCNC: 23 MG/DL (ref 8–23)
CALCIUM SERPL-MCNC: 10.2 MG/DL (ref 8.7–10.5)
CHLORIDE SERPL-SCNC: 103 MMOL/L (ref 95–110)
CO2 SERPL-SCNC: 25 MMOL/L (ref 23–29)
CREAT SERPL-MCNC: 1 MG/DL (ref 0.5–1.4)
EST. GFR  (AFRICAN AMERICAN): >60 ML/MIN/1.73 M^2
EST. GFR  (NON AFRICAN AMERICAN): 59.3 ML/MIN/1.73 M^2
GLUCOSE SERPL-MCNC: 80 MG/DL (ref 70–110)
POTASSIUM SERPL-SCNC: 4.3 MMOL/L (ref 3.5–5.1)
PROT SERPL-MCNC: 7.8 G/DL (ref 6–8.4)
SODIUM SERPL-SCNC: 139 MMOL/L (ref 136–145)

## 2022-06-17 PROCEDURE — 36415 COLL VENOUS BLD VENIPUNCTURE: CPT | Mod: PO | Performed by: DERMATOLOGY

## 2022-06-17 PROCEDURE — 80053 COMPREHEN METABOLIC PANEL: CPT | Performed by: DERMATOLOGY

## 2023-02-02 ENCOUNTER — OFFICE VISIT (OUTPATIENT)
Dept: FAMILY MEDICINE | Facility: CLINIC | Age: 68
End: 2023-02-02
Payer: MEDICARE

## 2023-02-02 VITALS
OXYGEN SATURATION: 99 % | TEMPERATURE: 98 F | HEIGHT: 63 IN | RESPIRATION RATE: 18 BRPM | BODY MASS INDEX: 36.32 KG/M2 | WEIGHT: 205 LBS | DIASTOLIC BLOOD PRESSURE: 82 MMHG | SYSTOLIC BLOOD PRESSURE: 118 MMHG | HEART RATE: 111 BPM

## 2023-02-02 DIAGNOSIS — E28.2 PCOS (POLYCYSTIC OVARIAN SYNDROME): ICD-10-CM

## 2023-02-02 DIAGNOSIS — Z12.4 PAP SMEAR FOR CERVICAL CANCER SCREENING: ICD-10-CM

## 2023-02-02 DIAGNOSIS — L65.9 HAIR THINNING: ICD-10-CM

## 2023-02-02 DIAGNOSIS — Z01.419 ENCOUNTER FOR WELL WOMAN EXAM WITH ROUTINE GYNECOLOGICAL EXAM: Primary | ICD-10-CM

## 2023-02-02 PROCEDURE — 87624 HPV HI-RISK TYP POOLED RSLT: CPT | Performed by: FAMILY MEDICINE

## 2023-02-02 PROCEDURE — 99214 OFFICE O/P EST MOD 30 MIN: CPT | Mod: ,,, | Performed by: FAMILY MEDICINE

## 2023-02-02 PROCEDURE — 99214 PR OFFICE/OUTPT VISIT, EST, LEVL IV, 30-39 MIN: ICD-10-PCS | Mod: ,,, | Performed by: FAMILY MEDICINE

## 2023-02-02 PROCEDURE — 88175 CYTOPATH C/V AUTO FLUID REDO: CPT | Performed by: FAMILY MEDICINE

## 2023-02-02 RX ORDER — FINASTERIDE 5 MG/1
5 TABLET, FILM COATED ORAL DAILY
Qty: 90 TABLET | Refills: 3 | Status: SHIPPED | OUTPATIENT
Start: 2023-02-02 | End: 2023-10-19

## 2023-02-02 RX ORDER — SPIRONOLACTONE 100 MG/1
100 TABLET, FILM COATED ORAL 2 TIMES DAILY
Qty: 180 TABLET | Refills: 3 | Status: SHIPPED | OUTPATIENT
Start: 2023-02-02 | End: 2023-05-04

## 2023-02-02 NOTE — PROGRESS NOTES
Subjective:       Patient ID: Keshia Son is a 67 y.o. female.    Chief Complaint: Gynecologic Exam (Also wants mammogram)    This patient is new to me and new to the clinic.   Keshia Son presents to the clinic today for woman's wellness exam. Patient states she is doing well with no complaints today.  Patient also requests medications that she was put on by Dermatology while she is here today.  Patient educated on plan of care, verbalized understanding.       Review of Systems   Constitutional:  Negative for activity change, appetite change, chills, diaphoresis and fever.   Respiratory:  Negative for apnea, cough, chest tightness, shortness of breath and wheezing.    Cardiovascular:  Negative for chest pain and leg swelling.   Genitourinary:  Negative for difficulty urinating, dysuria, flank pain, frequency, menstrual problem, pelvic pain, vaginal bleeding, vaginal discharge and vaginal pain.   Skin:  Negative for color change, rash and wound.   Neurological:  Negative for dizziness.     Patient Active Problem List   Diagnosis    Phantosmia    Nasal septal deviation    Hypertrophy of both inferior nasal turbinates    Hyposmia    Post-nasal drip      Routine Gyn Exam: Patient here for routine exam.     Gynecologic History  No LMP recorded.  Contraception: post menopausal status  Last Pap: 06/09/2015  Results: normal  Last Mammogram: about 5 years ago or more  Results: normal      Objective:      Physical Exam  Vitals reviewed. Exam conducted with a chaperone present.   Constitutional:       Appearance: Normal appearance. She is normal weight.   HENT:      Head: Normocephalic and atraumatic.      Nose: Nose normal.      Mouth/Throat:      Mouth: Mucous membranes are moist.   Eyes:      Conjunctiva/sclera: Conjunctivae normal.      Pupils: Pupils are equal, round, and reactive to light.   Cardiovascular:      Rate and Rhythm: Normal rate and regular rhythm.   Chest:   Breasts:     Right: Normal.      Left:  Normal.   Abdominal:      General: Abdomen is flat. Bowel sounds are normal. There is no distension.      Palpations: Abdomen is soft.      Tenderness: There is no abdominal tenderness.   Genitourinary:     General: Normal vulva.      Exam position: Lithotomy position.      Vagina: Normal.      Cervix: Normal.      Uterus: Normal.       Rectum: Normal.   Musculoskeletal:         General: Normal range of motion.      Cervical back: Normal range of motion.   Skin:     General: Skin is warm and dry.   Neurological:      General: No focal deficit present.      Mental Status: She is alert and oriented to person, place, and time.   Psychiatric:         Behavior: Behavior normal.       Lab Results   Component Value Date    CHOL 153 09/24/2021    TRIG 78 09/24/2021    HDL 52 09/24/2021    ALT 14 06/17/2022    AST 18 06/17/2022     06/17/2022    K 4.3 06/17/2022     06/17/2022    CREATININE 1.0 06/17/2022    BUN 23 06/17/2022    CO2 25 06/17/2022    TSH 2.361 09/24/2021    HGBA1C 5.8 (H) 11/18/2021     The 10-year ASCVD risk score (Esdras PERAZA, et al., 2019) is: 4.8%    Values used to calculate the score:      Age: 67 years      Sex: Female      Is Non- : No      Diabetic: No      Tobacco smoker: No      Systolic Blood Pressure: 118 mmHg      Is BP treated: No      HDL Cholesterol: 52 mg/dL      Total Cholesterol: 153 mg/dL    Assessment:       1. Encounter for well woman exam with routine gynecological exam    2. Pap smear for cervical cancer screening    3. PCOS (polycystic ovarian syndrome)    4. Hair thinning        Plan:       Keshia was seen today for gynecologic exam.    Diagnoses and all orders for this visit:    Encounter for well woman exam with routine gynecological exam / Pap smear for cervical cancer screening  -     Liquid-Based Pap Smear, Screening    PCOS (polycystic ovarian syndrome) / Hair thinning  -     spironolactone (ALDACTONE) 100 MG tablet; Take 1 tablet (100 mg total)  by mouth 2 (two) times daily.  -     finasteride (PROSCAR) 5 mg tablet; Take 1 tablet (5 mg total) by mouth once daily.  Continue medications as prescribed.  Follow up with PCP          Follow up if symptoms worsen or fail to improve, for scheduled appt.      Future Appointments       Date Provider Specialty Appt Notes    3/22/2023 Danielle Dickson MD Family Medicine [facility]

## 2023-02-14 LAB
CLINICAL INFO: ABNORMAL
CYTO CVX: ABNORMAL
CYTOLOGIST CVX/VAG CYTO: ABNORMAL
CYTOLOGIST CVX/VAG CYTO: ABNORMAL
CYTOLOGY CMNT CVX/VAG CYTO-IMP: ABNORMAL
CYTOLOGY PAP THIN PREP EXPLANATION: ABNORMAL
DATE OF PREVIOUS PAP: ABNORMAL
DATE PREVIOUS BX: NO
GEN CATEG CVX/VAG CYTO-IMP: ABNORMAL
HPV I/H RISK 4 DNA CVX QL NAA+PROBE: NOT DETECTED
LMP START DATE: ABNORMAL
MICROORGANISM CVX/VAG CYTO: ABNORMAL
PATHOLOGIST CVX/VAG CYTO: ABNORMAL
SERVICE CMNT-IMP: ABNORMAL
SPECIMEN SOURCE CVX/VAG CYTO: ABNORMAL
STAT OF ADQ CVX/VAG CYTO-IMP: ABNORMAL

## 2023-02-15 ENCOUNTER — TELEPHONE (OUTPATIENT)
Dept: FAMILY MEDICINE | Facility: CLINIC | Age: 68
End: 2023-02-15
Payer: MEDICARE

## 2023-02-15 ENCOUNTER — E-CONSULT (OUTPATIENT)
Dept: OBSTETRICS AND GYNECOLOGY | Facility: CLINIC | Age: 68
End: 2023-02-15
Payer: MEDICARE

## 2023-02-15 DIAGNOSIS — R87.610 ASCUS OF CERVIX WITH NEGATIVE HIGH RISK HPV: Primary | ICD-10-CM

## 2023-02-15 PROCEDURE — 99451 PR INTERPROF, PHONE/INTERNET/EHR, CONSULT, >= 5MINS: ICD-10-PCS | Mod: ,,, | Performed by: OBSTETRICS & GYNECOLOGY

## 2023-02-15 PROCEDURE — 99451 NTRPROF PH1/NTRNET/EHR 5/>: CPT | Mod: ,,, | Performed by: OBSTETRICS & GYNECOLOGY

## 2023-02-15 NOTE — PROGRESS NOTES
[facility]  Response for E-Consult     Patient Name: Keshia Son  MRN: [id number]  Primary Care Provider: Primary Doctor No   Requesting Provider: Samia Leija NP      Findings: ASCUS HPV negative pap    As per current ASCCP guidelines for this finding, recommend follow up pap in 1-3 years.  Follow up with PCP for routine health maintenance needs.      I did not speak to the requesting provider verbally about this.     Total time of Consultation: 10 minute    Percentage of time spent on verbal/written discussion: 100%     *This eConsult is based on the clinical data available to me and is furnished without benefit of a physical examination. The eConsult will need to be interpreted in light of any clinical issues or changes in patient status not available to me at the time of filing this eConsults. Significant changes in patient condition or level of acuity should result in immediate formal consultation and reevaluation. Please alert me if you have further questions.    Thank you for your consult.     Dave Small MD  [facility]

## 2023-02-16 ENCOUNTER — TELEPHONE (OUTPATIENT)
Dept: FAMILY MEDICINE | Facility: CLINIC | Age: 68
End: 2023-02-16
Payer: MEDICARE

## 2023-02-16 NOTE — TELEPHONE ENCOUNTER
----- Message from Sara Covarrubias sent at 2/16/2023  3:56 PM CST -----  Regarding: returning call  Contact: Patient  Type:  Patient Returning Call    Who Called:  Patient  Who Left Message for Patient:  Sue  Does the patient know what this is regarding?:    Best Call Back Number:  [phone] (home)     Additional Information:  Please call patient to advise. Thanks!

## 2023-02-16 NOTE — TELEPHONE ENCOUNTER
----- Message from Naun Rhodes sent at 2/16/2023  9:46 AM CST -----  Regarding: Return Call  Type:  Patient Returning Call    Who Called:Patient  Who Left Message for Patient:Rola  Does the patient know what this is regarding?:to schedule with OBGYN  Would the patient rather a call back or a response via [facility]? call  Best Call Back Number:[phone]  Additional Information: Patient call asking for Rola to call her back from yesterday. Thanks

## 2023-02-16 NOTE — TELEPHONE ENCOUNTER
"I consulted with another OB/GYN and he suggested repeating the pap smear in a year.   I have placed the referral for her.   The female OB/GYN's in [facility] are in [place] if she is ok with that? Otherwise she will likely need to find someone outside of [facility].     "Unfortunately, after this result, she will need to have two consecutive fully negative paps/hpv before discontinuation of pap screening at this point"  "

## 2023-02-17 ENCOUNTER — PATIENT MESSAGE (OUTPATIENT)
Dept: FAMILY MEDICINE | Facility: CLINIC | Age: 68
End: 2023-02-17
Payer: MEDICARE

## 2023-02-17 NOTE — TELEPHONE ENCOUNTER
Called patient - no answer; left VM requesting phone call back. This was also documented within another/duplicate encounter.

## 2023-02-27 ENCOUNTER — PATIENT MESSAGE (OUTPATIENT)
Dept: FAMILY MEDICINE | Facility: CLINIC | Age: 68
End: 2023-02-27
Payer: MEDICARE

## 2023-02-28 ENCOUNTER — PATIENT MESSAGE (OUTPATIENT)
Dept: FAMILY MEDICINE | Facility: CLINIC | Age: 68
End: 2023-02-28
Payer: MEDICARE

## 2023-03-08 ENCOUNTER — PATIENT MESSAGE (OUTPATIENT)
Dept: FAMILY MEDICINE | Facility: CLINIC | Age: 68
End: 2023-03-08
Payer: MEDICARE

## 2023-03-09 ENCOUNTER — TELEPHONE (OUTPATIENT)
Dept: OBSTETRICS AND GYNECOLOGY | Facility: CLINIC | Age: 68
End: 2023-03-09
Payer: MEDICARE

## 2023-03-09 ENCOUNTER — OFFICE VISIT (OUTPATIENT)
Dept: OBSTETRICS AND GYNECOLOGY | Facility: CLINIC | Age: 68
End: 2023-03-09
Payer: MEDICARE

## 2023-03-09 VITALS
SYSTOLIC BLOOD PRESSURE: 122 MMHG | WEIGHT: 200.19 LBS | RESPIRATION RATE: 18 BRPM | BODY MASS INDEX: 35.47 KG/M2 | DIASTOLIC BLOOD PRESSURE: 82 MMHG | HEIGHT: 63 IN

## 2023-03-09 DIAGNOSIS — R87.610 ASCUS OF CERVIX WITH NEGATIVE HIGH RISK HPV: Primary | ICD-10-CM

## 2023-03-09 PROCEDURE — 99204 OFFICE O/P NEW MOD 45 MIN: CPT | Mod: S$PBB,,, | Performed by: OBSTETRICS & GYNECOLOGY

## 2023-03-09 PROCEDURE — 99213 OFFICE O/P EST LOW 20 MIN: CPT | Mod: PBBFAC,PO | Performed by: OBSTETRICS & GYNECOLOGY

## 2023-03-09 PROCEDURE — 99999 PR PBB SHADOW E&M-EST. PATIENT-LVL III: ICD-10-PCS | Mod: PBBFAC,,, | Performed by: OBSTETRICS & GYNECOLOGY

## 2023-03-09 PROCEDURE — 99999 PR PBB SHADOW E&M-EST. PATIENT-LVL III: CPT | Mod: PBBFAC,,, | Performed by: OBSTETRICS & GYNECOLOGY

## 2023-03-09 PROCEDURE — 99204 PR OFFICE/OUTPT VISIT, NEW, LEVL IV, 45-59 MIN: ICD-10-PCS | Mod: S$PBB,,, | Performed by: OBSTETRICS & GYNECOLOGY

## 2023-03-09 NOTE — PROGRESS NOTES
"  Subjective:   Chief Complaint:  Abnormal Pap Smear (Ascus pap)       Patient ID: Keshia Son is a  67 y.o. female.    HRT: None    Date: 2023    The patient presents today due to the following:  On 2023 the patient had a Pap smear by her primary care physician.  Pap smear noted atypical cells of undetermined significance with negative HPV testing.  The patient presents today to discuss results of this Pap smear.  She reports no history of abnormal Pap smears but does have a long history of polycystic ovarian symptoms and is currently being treated due to increased androgen.    She also requests evaluation of a "bump" in the vulvar area.  This is longstanding and without changes.    GYN & OB History  No LMP recorded.     OB History    Para Term  AB Living   0 0 0 0 0 0   SAB IAB Ectopic Multiple Live Births   0 0 0 0 0         Past Medical History:   Diagnosis Date    Allergies     Androgen excess     History    Polycystic ovaries         Past Surgical History:   Procedure Laterality Date    ABDOMINOPLASTY  2021    BREAST REVISION SURGERY  2021    GASTRIC RESTRICTION SURGERY      Darryl    REDUCTION OF BOTH BREASTS      THIGH LIFT          TOTAL BODY LIFT              Review of patient's allergies indicates:  No Known Allergies     Medications    Current Outpatient Medications:     finasteride (PROSCAR) 5 mg tablet, Take 1 tablet (5 mg total) by mouth once daily., Disp: 90 tablet, Rfl: 3    spironolactone (ALDACTONE) 100 MG tablet, Take 1 tablet (100 mg total) by mouth 2 (two) times daily., Disp: 180 tablet, Rfl: 3    triamcinolone (NASACORT) 55 mcg nasal inhaler, 2 sprays by Nasal route once daily., Disp: 16.9 mL, Rfl: 5       Review of Systems (at today's evaluation)  Review of Systems   Constitutional:  Negative for fever and unexpected weight change.   Respiratory: Negative.     Cardiovascular:  Negative for chest pain and palpitations. "   Gastrointestinal:  Negative for nausea and vomiting.   Genitourinary:  Negative for dysuria, hematuria and urgency.        Gyn as per HPI   Neurological:  Negative for headaches.      Objective:      Vitals:    03/09/23 1611   BP: 122/82   Resp: 18     Physical Exam:   Constitutional: She appears well-developed and well-nourished.    HENT:   Head: Normocephalic.     Neck: No thyroid mass present.    Cardiovascular:  Normal rate.             Pulmonary/Chest: Effort normal. No respiratory distress.        Abdominal: Soft. There is no abdominal tenderness.     Genitourinary:                 Musculoskeletal: Normal range of motion.      Right lower leg: No edema.      Left lower leg: No edema.       Neurological: She is alert.   No gross lesions noted.    Skin: Skin is warm and dry.    Psychiatric: She has a normal mood and affect. Her speech is normal and behavior is normal. Mood normal.          Assessment:        1. ASCUS of cervix with negative high risk HPV         Plan:      ASCUS of cervix with negative high risk HPV      Follow up for as needed or for annual exam.        The above was reviewed discussed with the patient.  We discussed the significance of ASCUS but more importantly the patient's negative HPV status.  We reviewed the fact that the current recommendation is repeat annual exam in 1 year.  Findings on pelvic exam was small amount of scar tissue were reviewed and discussed with the patient is reassured that multiple physicians have agreed this is an area of scar tissue from her previous drains.    The patient's questions were answered, and she is in agreement with the current plan.

## 2023-03-09 NOTE — Clinical Note
Based on the patient's negative HPV status current recommendation is to repeat a Pap in 3 years but my preference is to follow up in 1 year given the overall situation.  If you have any questions or concerns please let me know.  SP

## 2023-03-22 ENCOUNTER — OFFICE VISIT (OUTPATIENT)
Dept: FAMILY MEDICINE | Facility: CLINIC | Age: 68
End: 2023-03-22
Payer: MEDICARE

## 2023-03-22 VITALS
WEIGHT: 204.38 LBS | OXYGEN SATURATION: 99 % | DIASTOLIC BLOOD PRESSURE: 82 MMHG | HEART RATE: 86 BPM | HEIGHT: 63 IN | SYSTOLIC BLOOD PRESSURE: 120 MMHG | BODY MASS INDEX: 36.21 KG/M2 | TEMPERATURE: 98 F

## 2023-03-22 DIAGNOSIS — Z00.00 ROUTINE HEALTH MAINTENANCE: ICD-10-CM

## 2023-03-22 DIAGNOSIS — Z13.29 THYROID DISORDER SCREEN: Primary | ICD-10-CM

## 2023-03-22 DIAGNOSIS — Z13.220 ENCOUNTER FOR LIPID SCREENING FOR CARDIOVASCULAR DISEASE: ICD-10-CM

## 2023-03-22 DIAGNOSIS — E66.9 OBESITY (BMI 35.0-39.9 WITHOUT COMORBIDITY): ICD-10-CM

## 2023-03-22 DIAGNOSIS — Z13.6 ENCOUNTER FOR LIPID SCREENING FOR CARDIOVASCULAR DISEASE: ICD-10-CM

## 2023-03-22 DIAGNOSIS — R79.9 ABNORMAL FINDING OF BLOOD CHEMISTRY, UNSPECIFIED: ICD-10-CM

## 2023-03-22 DIAGNOSIS — J30.89 ALLERGIC RHINITIS DUE TO OTHER ALLERGIC TRIGGER, UNSPECIFIED SEASONALITY: ICD-10-CM

## 2023-03-22 DIAGNOSIS — M81.6 LOCALIZED OSTEOPOROSIS (LEQUESNE): ICD-10-CM

## 2023-03-22 PROCEDURE — 99213 OFFICE O/P EST LOW 20 MIN: CPT | Mod: ,,, | Performed by: FAMILY MEDICINE

## 2023-03-22 PROCEDURE — 99213 PR OFFICE/OUTPT VISIT, EST, LEVL III, 20-29 MIN: ICD-10-PCS | Mod: ,,, | Performed by: FAMILY MEDICINE

## 2023-03-22 RX ORDER — MONTELUKAST SODIUM 10 MG/1
10 TABLET ORAL NIGHTLY
Qty: 90 TABLET | Refills: 3 | Status: SHIPPED | OUTPATIENT
Start: 2023-03-22 | End: 2023-05-04

## 2023-03-22 RX ORDER — CETIRIZINE HYDROCHLORIDE 10 MG/1
10 TABLET ORAL DAILY
Qty: 90 TABLET | Refills: 3 | Status: SHIPPED | OUTPATIENT
Start: 2023-03-22 | End: 2023-05-04

## 2023-03-22 RX ORDER — ALBUTEROL SULFATE 90 UG/1
2 AEROSOL, METERED RESPIRATORY (INHALATION) EVERY 6 HOURS PRN
Qty: 6.7 G | Refills: 5 | Status: SHIPPED | OUTPATIENT
Start: 2023-03-22 | End: 2023-10-19

## 2023-03-22 NOTE — PROGRESS NOTES
Subjective:       Patient ID: Keshia Son is a 67 y.o. female.    Chief Complaint: Establish Care    Ms. Son is here for routine visit for chronic allergic rhinitis.  She generally sees Samia Leija for her routine women's health.  She is due routine labs.  She states that she is had our GI's her entire life.  However approximately 2 months ago she was out in the rain and started getting sinus congestion, postnasal drainage and a cough productive of clear mucus.  That cough has been continuing although the congestion is improving.  She does not feel like she has any kind of infection just significant allergic response right now.  She is on Nasacort.  We discussed using Singulair, Zyrtec, and albuterol for the bronchospasm coughing.  We discussed the use of steroids in severe allergic response.  She does not want to try steroids but is willing to try Singulair and Zyrtec as well as albuterol for her bronchospasm coughing.      She also is overweight with a BMI of 36.2.  She had questions about Ozempic in the other injectable diabetic medications that are now being used for weight loss.  However she has no medication insurance coverage and she was questioning the cost of these medications.  I told her most of these medications are currently on back order so even diabetics have difficulty getting her medications.  If no insurance, these medications are upwards of 6-800 dollars per month.  I did suggest that she be referred to the weight management Clinic through [facility] and she agreed that that would be a good idea so we will put that referral in for her.    Review of Systems   Constitutional:  Positive for unexpected weight change.        Weight leandra due to PCOS   HENT:  Positive for congestion, postnasal drip and sneezing. Negative for ear pain and sinus pain.    Respiratory:  Positive for cough. Negative for shortness of breath and stridor.    Cardiovascular:  Negative for chest pain.     Patient Active  Problem List   Diagnosis    Phantosmia    Nasal septal deviation    Hypertrophy of both inferior nasal turbinates    Hyposmia    Post-nasal drip       Objective:      Physical Exam  Constitutional:       Appearance: Normal appearance. She is obese.   HENT:      Head: Normocephalic and atraumatic.      Right Ear: Ear canal normal.      Left Ear: Ear canal normal.      Ears:      Comments: Bilateral TMs with effusions.  No injection of either TM     Nose:      Comments: Boggy turbinates bilaterally.     Mouth/Throat:      Mouth: Mucous membranes are moist.   Eyes:      Extraocular Movements: Extraocular movements intact.      Pupils: Pupils are equal, round, and reactive to light.   Cardiovascular:      Rate and Rhythm: Normal rate and regular rhythm.   Pulmonary:      Effort: Pulmonary effort is normal.   Musculoskeletal:         General: Normal range of motion.   Skin:     General: Skin is warm and dry.   Neurological:      General: No focal deficit present.      Mental Status: She is alert and oriented to person, place, and time.   Psychiatric:         Mood and Affect: Mood normal.         Behavior: Behavior normal.       Lab Results   Component Value Date    CHOL 153 09/24/2021    TRIG 78 09/24/2021    HDL 52 09/24/2021    ALT 14 06/17/2022    AST 18 06/17/2022     06/17/2022    K 4.3 06/17/2022     06/17/2022    CREATININE 1.0 06/17/2022    BUN 23 06/17/2022    CO2 25 06/17/2022    TSH 2.361 09/24/2021    HGBA1C 5.8 (H) 11/18/2021     The 10-year ASCVD risk score (Esdras PERAZA, et al., 2019) is: 4.9%    Values used to calculate the score:      Age: 67 years      Sex: Female      Is Non- : No      Diabetic: No      Tobacco smoker: No      Systolic Blood Pressure: 120 mmHg      Is BP treated: No      HDL Cholesterol: 52 mg/dL      Total Cholesterol: 153 mg/dL  Visit Vitals  /82 (BP Location: Left arm, Patient Position: Sitting, BP Method: Large (Manual))   Pulse 86   Temp 97.8  "°F (36.6 °C)   Ht 5' 3" (1.6 m)   Wt 92.7 kg (204 lb 5.9 oz)   SpO2 99%   BMI 36.20 kg/m²      Assessment:       1. Thyroid disorder screen    2. Routine health maintenance    3. Encounter for lipid screening for cardiovascular disease    4. Abnormal finding of blood chemistry, unspecified    5. Localized osteoporosis ([place]ne)    6. Obesity (BMI 35.0-39.9 without comorbidity)    7. Allergic rhinitis due to other allergic trigger, unspecified seasonality          Plan:       1. Thyroid disorder screen  -     TSH; Future; Expected date: 03/22/2023    2. Routine health maintenance  -     Lipid Panel; Future; Expected date: 03/22/2023  -     CBC Auto Differential; Future; Expected date: 03/22/2023  -     Comprehensive Metabolic Panel; Future; Expected date: 03/22/2023  -     Hemoglobin A1C; Future; Expected date: 03/22/2023  -     TSH; Future; Expected date: 03/22/2023    3. Encounter for lipid screening for cardiovascular disease  -     Lipid Panel; Future; Expected date: 03/22/2023    4. Abnormal finding of blood chemistry, unspecified  -     CBC Auto Differential; Future; Expected date: 03/22/2023  -     Hemoglobin A1C; Future; Expected date: 03/22/2023    5. Localized osteoporosis ([place])  -     TSH; Future; Expected date: 03/22/2023    6. Obesity (BMI 35.0-39.9 without comorbidity)  -     Cancel: Ambulatory referral/consult to Weight Management Program; Future; Expected date: 03/29/2023  -     Ambulatory referral/consult to Weight Management Program; Future; Expected date: 03/29/2023    7. Allergic rhinitis due to other allergic trigger, unspecified seasonality  -     montelukast (SINGULAIR) 10 mg tablet; Take 1 tablet (10 mg total) by mouth every evening.  Dispense: 90 tablet; Refill: 3    Other orders  -     cetirizine (ZYRTEC) 10 MG tablet; Take 1 tablet (10 mg total) by mouth once daily.  Dispense: 90 tablet; Refill: 3  -     albuterol (VENTOLIN HFA) 90 mcg/actuation inhaler; Inhale 2 puffs into the lungs " every 6 (six) hours as needed for Wheezing. Rescue  Dispense: 6.7 g; Refill: 5       Follow up in about 6 months (around 9/22/2023), or if symptoms worsen or fail to improve.      Future Appointments       Date Specialty Appt Notes    3/24/2023 Lab labs

## 2023-03-24 ENCOUNTER — LAB VISIT (OUTPATIENT)
Dept: LAB | Facility: CLINIC | Age: 68
End: 2023-03-24
Payer: MEDICARE

## 2023-03-24 DIAGNOSIS — Z13.6 ENCOUNTER FOR LIPID SCREENING FOR CARDIOVASCULAR DISEASE: ICD-10-CM

## 2023-03-24 DIAGNOSIS — Z13.29 THYROID DISORDER SCREEN: ICD-10-CM

## 2023-03-24 DIAGNOSIS — R79.9 ABNORMAL FINDING OF BLOOD CHEMISTRY, UNSPECIFIED: ICD-10-CM

## 2023-03-24 DIAGNOSIS — Z13.220 ENCOUNTER FOR LIPID SCREENING FOR CARDIOVASCULAR DISEASE: ICD-10-CM

## 2023-03-24 DIAGNOSIS — M81.6 LOCALIZED OSTEOPOROSIS (LEQUESNE): ICD-10-CM

## 2023-03-24 DIAGNOSIS — Z00.00 ROUTINE HEALTH MAINTENANCE: ICD-10-CM

## 2023-03-24 LAB
ALBUMIN SERPL BCP-MCNC: 3.6 G/DL (ref 3.5–5.2)
ALP SERPL-CCNC: 63 U/L (ref 55–135)
ALT SERPL W/O P-5'-P-CCNC: 19 U/L (ref 10–44)
ANION GAP SERPL CALC-SCNC: 10 MMOL/L (ref 8–16)
AST SERPL-CCNC: 15 U/L (ref 10–40)
BASOPHILS # BLD AUTO: 0.04 K/UL (ref 0–0.2)
BASOPHILS NFR BLD: 0.4 % (ref 0–1.9)
BILIRUB SERPL-MCNC: 1 MG/DL (ref 0.1–1)
BUN SERPL-MCNC: 11 MG/DL (ref 8–23)
CALCIUM SERPL-MCNC: 9.8 MG/DL (ref 8.7–10.5)
CHLORIDE SERPL-SCNC: 101 MMOL/L (ref 95–110)
CHOLEST SERPL-MCNC: 178 MG/DL (ref 120–199)
CHOLEST/HDLC SERPL: 3.2 {RATIO} (ref 2–5)
CO2 SERPL-SCNC: 25 MMOL/L (ref 23–29)
CREAT SERPL-MCNC: 0.8 MG/DL (ref 0.5–1.4)
DIFFERENTIAL METHOD: ABNORMAL
EOSINOPHIL # BLD AUTO: 0.1 K/UL (ref 0–0.5)
EOSINOPHIL NFR BLD: 0.9 % (ref 0–8)
ERYTHROCYTE [DISTWIDTH] IN BLOOD BY AUTOMATED COUNT: 13.4 % (ref 11.5–14.5)
EST. GFR  (NO RACE VARIABLE): >60 ML/MIN/1.73 M^2
ESTIMATED AVG GLUCOSE: 128 MG/DL (ref 68–131)
GLUCOSE SERPL-MCNC: 113 MG/DL (ref 70–110)
HBA1C MFR BLD: 6.1 % (ref 4–5.6)
HCT VFR BLD AUTO: 42.4 % (ref 37–48.5)
HDLC SERPL-MCNC: 55 MG/DL (ref 40–75)
HDLC SERPL: 30.9 % (ref 20–50)
HGB BLD-MCNC: 13.8 G/DL (ref 12–16)
IMM GRANULOCYTES # BLD AUTO: 0.05 K/UL (ref 0–0.04)
IMM GRANULOCYTES NFR BLD AUTO: 0.5 % (ref 0–0.5)
LDLC SERPL CALC-MCNC: 105.2 MG/DL (ref 63–159)
LYMPHOCYTES # BLD AUTO: 2.2 K/UL (ref 1–4.8)
LYMPHOCYTES NFR BLD: 23.1 % (ref 18–48)
MCH RBC QN AUTO: 28.1 PG (ref 27–31)
MCHC RBC AUTO-ENTMCNC: 32.5 G/DL (ref 32–36)
MCV RBC AUTO: 86 FL (ref 82–98)
MONOCYTES # BLD AUTO: 1 K/UL (ref 0.3–1)
MONOCYTES NFR BLD: 9.9 % (ref 4–15)
NEUTROPHILS # BLD AUTO: 6.3 K/UL (ref 1.8–7.7)
NEUTROPHILS NFR BLD: 65.2 % (ref 38–73)
NONHDLC SERPL-MCNC: 123 MG/DL
NRBC BLD-RTO: 0 /100 WBC
PLATELET # BLD AUTO: 342 K/UL (ref 150–450)
PMV BLD AUTO: 11.1 FL (ref 9.2–12.9)
POTASSIUM SERPL-SCNC: 4.1 MMOL/L (ref 3.5–5.1)
PROT SERPL-MCNC: 7.5 G/DL (ref 6–8.4)
RBC # BLD AUTO: 4.91 M/UL (ref 4–5.4)
SODIUM SERPL-SCNC: 136 MMOL/L (ref 136–145)
TRIGL SERPL-MCNC: 89 MG/DL (ref 30–150)
TSH SERPL DL<=0.005 MIU/L-ACNC: 1.99 UIU/ML (ref 0.4–4)
WBC # BLD AUTO: 9.68 K/UL (ref 3.9–12.7)

## 2023-03-24 PROCEDURE — 83036 HEMOGLOBIN GLYCOSYLATED A1C: CPT | Performed by: FAMILY MEDICINE

## 2023-03-24 PROCEDURE — 84443 ASSAY THYROID STIM HORMONE: CPT | Performed by: FAMILY MEDICINE

## 2023-03-24 PROCEDURE — 85025 COMPLETE CBC W/AUTO DIFF WBC: CPT | Performed by: FAMILY MEDICINE

## 2023-03-24 PROCEDURE — 80053 COMPREHEN METABOLIC PANEL: CPT | Performed by: FAMILY MEDICINE

## 2023-03-24 PROCEDURE — 80061 LIPID PANEL: CPT | Performed by: FAMILY MEDICINE

## 2023-04-01 ENCOUNTER — PATIENT MESSAGE (OUTPATIENT)
Dept: ADMINISTRATIVE | Facility: HOSPITAL | Age: 68
End: 2023-04-01
Payer: MEDICARE

## 2023-04-11 ENCOUNTER — PATIENT MESSAGE (OUTPATIENT)
Dept: ADMINISTRATIVE | Facility: HOSPITAL | Age: 68
End: 2023-04-11
Payer: MEDICARE

## 2023-04-27 ENCOUNTER — TELEPHONE (OUTPATIENT)
Dept: OBSTETRICS AND GYNECOLOGY | Facility: CLINIC | Age: 68
End: 2023-04-27
Payer: MEDICARE

## 2023-04-27 NOTE — TELEPHONE ENCOUNTER
Pt states the bump in her groin has gotten bigger and has changed from being circular in shape to oblong. Appt scheduled 5/4 and placed on the waitlist. Pt voiced understanding.

## 2023-04-27 NOTE — TELEPHONE ENCOUNTER
----- Message from Shanice Echavarria sent at 4/27/2023 10:27 AM CDT -----  Regarding: Needs sooner appt  Type:  Sooner Appointment Request    Caller is requesting a sooner appointment.  Caller declined first available appointment listed below.  Caller will not accept being placed on the waitlist and is requesting a message be sent to doctor.    Name of Caller:  Keshia  When is the first available appointment?  Dept book  Symptoms:  lump in groin got larger, dr anderson told her to call if it increased in size  Best Call Back Number:  [phone]    Additional Information:

## 2023-05-04 ENCOUNTER — OFFICE VISIT (OUTPATIENT)
Dept: OBSTETRICS AND GYNECOLOGY | Facility: CLINIC | Age: 68
End: 2023-05-04
Payer: MEDICARE

## 2023-05-04 VITALS
WEIGHT: 204.38 LBS | HEIGHT: 63 IN | DIASTOLIC BLOOD PRESSURE: 78 MMHG | RESPIRATION RATE: 20 BRPM | BODY MASS INDEX: 36.21 KG/M2 | SYSTOLIC BLOOD PRESSURE: 128 MMHG

## 2023-05-04 DIAGNOSIS — L65.9 HAIR THINNING: ICD-10-CM

## 2023-05-04 DIAGNOSIS — R22.43 LOCALIZED SWELLING OF BOTH LOWER LEGS: ICD-10-CM

## 2023-05-04 DIAGNOSIS — N76.4 VULVAR ABSCESS: Primary | ICD-10-CM

## 2023-05-04 PROCEDURE — 99999 PR PBB SHADOW E&M-EST. PATIENT-LVL III: CPT | Mod: PBBFAC,,, | Performed by: OBSTETRICS & GYNECOLOGY

## 2023-05-04 PROCEDURE — 56405 PR I&D OF VULVA/PERINEUM ABSCESS: ICD-10-PCS | Mod: S$PBB,,, | Performed by: OBSTETRICS & GYNECOLOGY

## 2023-05-04 PROCEDURE — 99213 OFFICE O/P EST LOW 20 MIN: CPT | Mod: PBBFAC,PO,25 | Performed by: OBSTETRICS & GYNECOLOGY

## 2023-05-04 PROCEDURE — 99214 PR OFFICE/OUTPT VISIT, EST, LEVL IV, 30-39 MIN: ICD-10-PCS | Mod: 25,S$PBB,, | Performed by: OBSTETRICS & GYNECOLOGY

## 2023-05-04 PROCEDURE — 87070 CULTURE OTHR SPECIMN AEROBIC: CPT | Performed by: OBSTETRICS & GYNECOLOGY

## 2023-05-04 PROCEDURE — 87075 CULTR BACTERIA EXCEPT BLOOD: CPT | Performed by: OBSTETRICS & GYNECOLOGY

## 2023-05-04 PROCEDURE — 56405 I&D VULVA/PERINEAL ABSCESS: CPT | Mod: S$PBB,,, | Performed by: OBSTETRICS & GYNECOLOGY

## 2023-05-04 PROCEDURE — 99214 OFFICE O/P EST MOD 30 MIN: CPT | Mod: 25,S$PBB,, | Performed by: OBSTETRICS & GYNECOLOGY

## 2023-05-04 PROCEDURE — 56405 I&D VULVA/PERINEAL ABSCESS: CPT | Mod: PBBFAC,PO | Performed by: OBSTETRICS & GYNECOLOGY

## 2023-05-04 PROCEDURE — 99999 PR PBB SHADOW E&M-EST. PATIENT-LVL III: ICD-10-PCS | Mod: PBBFAC,,, | Performed by: OBSTETRICS & GYNECOLOGY

## 2023-05-04 RX ORDER — SULFAMETHOXAZOLE AND TRIMETHOPRIM 400; 80 MG/1; MG/1
1 TABLET ORAL 2 TIMES DAILY
Qty: 14 TABLET | Refills: 0 | Status: SHIPPED | OUTPATIENT
Start: 2023-05-04 | End: 2023-05-11

## 2023-05-04 RX ORDER — SPIRONOLACTONE 50 MG/1
50 TABLET, FILM COATED ORAL DAILY
Qty: 90 TABLET | Refills: 3 | Status: SHIPPED | OUTPATIENT
Start: 2023-05-04 | End: 2023-10-19

## 2023-05-04 NOTE — PROGRESS NOTES
Subjective:   Chief Complaint:  Follow-up (Left groin lump that has increased in size, tender and red)       Patient ID: Keshia Son is a  67 y.o. female.    HRT: None    Date: 2023    The patient presents today due to the following:  The patient has had an area of concern in the upper left vulva which was previously evaluated and felt to be scar tissue.      Over the last 10 days the area has increased in size become red and very painful.  She feels this may be secondary to recently shaving in this area.    In addition the patient has known elevated testosterone and had previously been treated with spironolactone.  She continues to report issues with hair thinning.    GYN & OB History  No LMP recorded. Patient is postmenopausal.     OB History    Para Term  AB Living   0 0 0 0 0 0   SAB IAB Ectopic Multiple Live Births   0 0 0 0 0         Past Medical History:   Diagnosis Date    Allergies     Androgen excess     History    Polycystic ovaries         Past Surgical History:   Procedure Laterality Date    ABDOMINOPLASTY  2021    BREAST REVISION SURGERY  2021    GASTRIC RESTRICTION SURGERY      Darryl    REDUCTION OF BOTH BREASTS      THIGH LIFT          TOTAL BODY LIFT              Review of patient's allergies indicates:  No Known Allergies     Medications    Current Outpatient Medications:     albuterol (VENTOLIN HFA) 90 mcg/actuation inhaler, Inhale 2 puffs into the lungs every 6 (six) hours as needed for Wheezing. Rescue, Disp: 6.7 g, Rfl: 5    finasteride (PROSCAR) 5 mg tablet, Take 1 tablet (5 mg total) by mouth once daily., Disp: 90 tablet, Rfl: 3    spironolactone (ALDACTONE) 50 MG tablet, Take 1 tablet (50 mg total) by mouth once daily., Disp: 90 tablet, Rfl: 3       Review of Systems (at today's evaluation)  Review of Systems   Constitutional:  Negative for fever and unexpected weight change.   Respiratory: Negative.     Cardiovascular:  Negative for  chest pain and palpitations.   Gastrointestinal:  Negative for nausea and vomiting.   Endocrine: Positive for hair loss.   Genitourinary:  Negative for dysuria, hematuria and urgency.        Gyn as per HPI   Neurological:  Negative for headaches.      Objective:      Vitals:    05/04/23 1509   BP: 128/78   Resp: 20     Physical Exam:   Constitutional: She appears well-developed and well-nourished.    HENT:   Head: Normocephalic.     Neck: No thyroid mass present.    Cardiovascular:  Normal rate.             Pulmonary/Chest: Effort normal. No respiratory distress.        Abdominal: Soft. There is no abdominal tenderness.     Genitourinary:                 Musculoskeletal: Normal range of motion.      Right lower leg: No edema.      Left lower leg: No edema.       Neurological: She is alert.   No gross lesions noted.    Skin: Skin is warm and dry.    Psychiatric: She has a normal mood and affect. Her speech is normal and behavior is normal. Mood normal.       Procedure Note:  Incision and drainage of vulvar abscess    Date: 05/04/2023    Prior to the procedure the findings on physical exam as well as the pros cons risks benefits alternatives indications of incision and drainage of vulvar abscess were reviewed and discussed with the patient.  The patient's questions were answered and she is in agreement with the current plan.    ANESTHESIA:  2 cc of 2% plain lidocaine.    ESTIMATED BLOOD LOSS:  Minimal     FINDINGS:  As per physical exam    PROCEDURE:     The patient was in the dorsal lithotomy position on the exam table.  The area of concern was cleaned with Betadine.  Lidocaine as above was injected into the area of concern.    Using a # 11 scalpel an incision was made into the body of the abscess.  Purulent drainage was noted.    A culture was obtained.    Hemostasis was obtained with direct pressure.    The patient tolerated the procedure without difficulty.    Complications:  None       Assessment:        1.  Vulvar abscess    2. Localized swelling of both lower legs    3. Hair thinning      Plan:      Vulvar abscess  -     Culture, Anaerobic  -     sulfamethoxazole-trimethoprim 400-80mg (BACTRIM) 400-80 mg per tablet; Take 1 tablet by mouth 2 (two) times daily. for 7 days  Dispense: 14 tablet; Refill: 0  -     Aerobic culture    Localized swelling of both lower legs  -     spironolactone (ALDACTONE) 50 MG tablet; Take 1 tablet (50 mg total) by mouth once daily.  Dispense: 90 tablet; Refill: 3    Hair thinning      Follow up in about 2 weeks (around 5/18/2023).        The above was reviewed discussed with the patient.    We discussed the findings of vulvar abscess.  The patient tolerated the excision and drainage without difficulty.    At this time will proceed as follows:  Culture of the abscess was obtained  The patient is being started on oral Bactrim  The patient will continue on spironolactone 50 mg daily  The pros, cons, risks, benefits, alternatives and indications of the medication(s) prescribed, as well as appropriate use and potential side effects were discussed.  We will base further evaluation on the patient's response to incision and drainage, results of the culture and the patient's response to prescribed medications.      The patient's questions regarding the above were answered and she is in agreement with the current plan.

## 2023-05-08 LAB — BACTERIA SPEC AEROBE CULT: NORMAL

## 2023-05-09 LAB — BACTERIA SPEC ANAEROBE CULT: NORMAL

## 2023-05-18 ENCOUNTER — OFFICE VISIT (OUTPATIENT)
Dept: OBSTETRICS AND GYNECOLOGY | Facility: CLINIC | Age: 68
End: 2023-05-18
Payer: MEDICARE

## 2023-05-18 VITALS
DIASTOLIC BLOOD PRESSURE: 66 MMHG | WEIGHT: 204.38 LBS | RESPIRATION RATE: 16 BRPM | BODY MASS INDEX: 36.21 KG/M2 | SYSTOLIC BLOOD PRESSURE: 100 MMHG | HEIGHT: 63 IN

## 2023-05-18 DIAGNOSIS — N76.4 VULVAR ABSCESS: Primary | ICD-10-CM

## 2023-05-18 PROCEDURE — 99213 PR OFFICE/OUTPT VISIT, EST, LEVL III, 20-29 MIN: ICD-10-PCS | Mod: S$PBB,,, | Performed by: OBSTETRICS & GYNECOLOGY

## 2023-05-18 PROCEDURE — 99213 OFFICE O/P EST LOW 20 MIN: CPT | Mod: PBBFAC,PO | Performed by: OBSTETRICS & GYNECOLOGY

## 2023-05-18 PROCEDURE — 99999 PR PBB SHADOW E&M-EST. PATIENT-LVL III: ICD-10-PCS | Mod: PBBFAC,,, | Performed by: OBSTETRICS & GYNECOLOGY

## 2023-05-18 PROCEDURE — 99213 OFFICE O/P EST LOW 20 MIN: CPT | Mod: S$PBB,,, | Performed by: OBSTETRICS & GYNECOLOGY

## 2023-05-18 PROCEDURE — 99999 PR PBB SHADOW E&M-EST. PATIENT-LVL III: CPT | Mod: PBBFAC,,, | Performed by: OBSTETRICS & GYNECOLOGY

## 2023-05-18 NOTE — PROGRESS NOTES
Subjective:   Chief Complaint:  Follow-up (I&D follow up)       Patient ID: Keshia Son is a  67 y.o. female.    HRT: None    Date: 2023    The patient presents today due to the following:  The patient has had an area of concern in the upper left vulva which was previously evaluated and felt to be scar tissue.      Over the last 10 days the area has increased in size become red and very painful.  She feels this may be secondary to recently shaving in this area.    In addition the patient has known elevated testosterone and had previously been treated with spironolactone.  She continues to report issues with hair thinning.    Date: 2023    The patient presents today for follow-up.  She was last seen as above.    At her last evaluation a perivulvar abscess was incised and drained.  The patient reports no further drainage or issues from this area and is overall doing well.    GYN & OB History  No LMP recorded. Patient is postmenopausal.     OB History    Para Term  AB Living   0 0 0 0 0 0   SAB IAB Ectopic Multiple Live Births   0 0 0 0 0         Past Medical History:   Diagnosis Date    Allergies     Androgen excess     History    Polycystic ovaries         Past Surgical History:   Procedure Laterality Date    ABDOMINOPLASTY  2021    BREAST REVISION SURGERY  2021    GASTRIC RESTRICTION SURGERY      Darryl    REDUCTION OF BOTH BREASTS      THIGH LIFT          TOTAL BODY LIFT              Review of patient's allergies indicates:  No Known Allergies     Medications    Current Outpatient Medications:     albuterol (VENTOLIN HFA) 90 mcg/actuation inhaler, Inhale 2 puffs into the lungs every 6 (six) hours as needed for Wheezing. Rescue, Disp: 6.7 g, Rfl: 5    finasteride (PROSCAR) 5 mg tablet, Take 1 tablet (5 mg total) by mouth once daily., Disp: 90 tablet, Rfl: 3    spironolactone (ALDACTONE) 50 MG tablet, Take 1 tablet (50 mg total) by mouth once daily. (Patient  not taking: Reported on 5/18/2023), Disp: 90 tablet, Rfl: 3       Review of Systems (at today's evaluation)  Review of Systems   Constitutional:  Negative for fever and unexpected weight change.   Respiratory: Negative.     Cardiovascular:  Negative for chest pain and palpitations.   Gastrointestinal:  Negative for nausea and vomiting.   Endocrine: Positive for hair loss.   Genitourinary:  Negative for dysuria, hematuria and urgency.        Gyn as per HPI   Neurological:  Negative for headaches.      Objective:      Vitals:    05/18/23 1550   BP: 100/66   Resp: 16     Physical Exam:   Constitutional: She appears well-developed and well-nourished.    HENT:   Head: Normocephalic.     Neck: No thyroid mass present.    Cardiovascular:  Normal rate.             Pulmonary/Chest: Effort normal. No respiratory distress.        Abdominal: Soft. There is no abdominal tenderness.     Genitourinary:                 Musculoskeletal: Normal range of motion.      Right lower leg: No edema.      Left lower leg: No edema.       Neurological: She is alert.   No gross lesions noted.    Skin: Skin is warm and dry.    Psychiatric: She has a normal mood and affect. Her speech is normal and behavior is normal. Mood normal.        Assessment:        1. Vulvar abscess        Plan:      Vulvar abscess        Follow up for as needed / for any GYN related issues.        The above was reviewed discussed with the patient.    Findings on physical exam were discussed and the patient is doing well status post incision and drainage of the abscess.    The patient's questions regarding above are answered and she is in agreement with the current plan.

## 2023-05-25 ENCOUNTER — PATIENT MESSAGE (OUTPATIENT)
Dept: ADMINISTRATIVE | Facility: HOSPITAL | Age: 68
End: 2023-05-25
Payer: MEDICARE

## 2023-05-26 ENCOUNTER — PATIENT MESSAGE (OUTPATIENT)
Dept: FAMILY MEDICINE | Facility: CLINIC | Age: 68
End: 2023-05-26
Payer: MEDICARE

## 2023-06-05 ENCOUNTER — PATIENT MESSAGE (OUTPATIENT)
Dept: FAMILY MEDICINE | Facility: CLINIC | Age: 68
End: 2023-06-05
Payer: MEDICARE

## 2023-06-05 DIAGNOSIS — Z12.31 ENCOUNTER FOR SCREENING MAMMOGRAM FOR BREAST CANCER: Primary | ICD-10-CM

## 2023-06-08 ENCOUNTER — HOSPITAL ENCOUNTER (OUTPATIENT)
Dept: RADIOLOGY | Facility: HOSPITAL | Age: 68
Discharge: HOME | End: 2023-06-08
Attending: FAMILY MEDICINE
Payer: MEDICARE

## 2023-06-08 DIAGNOSIS — Z12.31 ENCOUNTER FOR SCREENING MAMMOGRAM FOR BREAST CANCER: ICD-10-CM

## 2023-06-08 PROCEDURE — 77067 SCR MAMMO BI INCL CAD: CPT | Mod: 26,,, | Performed by: RADIOLOGY

## 2023-06-08 PROCEDURE — 77063 MAMMO DIGITAL SCREENING BILAT WITH TOMO: ICD-10-PCS | Mod: 26,,, | Performed by: RADIOLOGY

## 2023-06-08 PROCEDURE — 77067 SCR MAMMO BI INCL CAD: CPT | Mod: TC

## 2023-06-08 PROCEDURE — 77063 BREAST TOMOSYNTHESIS BI: CPT | Mod: 26,,, | Performed by: RADIOLOGY

## 2023-06-08 PROCEDURE — 77067 MAMMO DIGITAL SCREENING BILAT WITH TOMO: ICD-10-PCS | Mod: 26,,, | Performed by: RADIOLOGY

## 2023-08-04 ENCOUNTER — PATIENT MESSAGE (OUTPATIENT)
Dept: OBSTETRICS AND GYNECOLOGY | Facility: CLINIC | Age: 68
End: 2023-08-04
Payer: MEDICARE

## 2023-08-04 DIAGNOSIS — L65.9 HAIR THINNING: ICD-10-CM

## 2023-10-10 ENCOUNTER — PATIENT MESSAGE (OUTPATIENT)
Dept: OBSTETRICS AND GYNECOLOGY | Facility: CLINIC | Age: 68
End: 2023-10-10
Payer: MEDICARE

## 2023-10-19 ENCOUNTER — LAB VISIT (OUTPATIENT)
Dept: LAB | Facility: HOSPITAL | Age: 68
End: 2023-10-19
Attending: OBSTETRICS & GYNECOLOGY
Payer: MEDICARE

## 2023-10-19 ENCOUNTER — OFFICE VISIT (OUTPATIENT)
Dept: OBSTETRICS AND GYNECOLOGY | Facility: CLINIC | Age: 68
End: 2023-10-19
Payer: MEDICARE

## 2023-10-19 VITALS
RESPIRATION RATE: 12 BRPM | DIASTOLIC BLOOD PRESSURE: 76 MMHG | HEIGHT: 63 IN | BODY MASS INDEX: 33.68 KG/M2 | SYSTOLIC BLOOD PRESSURE: 114 MMHG | WEIGHT: 190.06 LBS

## 2023-10-19 DIAGNOSIS — L65.9 HAIR THINNING: ICD-10-CM

## 2023-10-19 DIAGNOSIS — L72.3 SEBACEOUS CYST OF AXILLA: Primary | ICD-10-CM

## 2023-10-19 LAB
T3 SERPL-MCNC: 94 NG/DL (ref 60–180)
T4 FREE SERPL-MCNC: 1.06 NG/DL (ref 0.71–1.51)
TSH SERPL DL<=0.005 MIU/L-ACNC: 1.62 UIU/ML (ref 0.4–4)

## 2023-10-19 PROCEDURE — 36415 COLL VENOUS BLD VENIPUNCTURE: CPT | Mod: PO | Performed by: OBSTETRICS & GYNECOLOGY

## 2023-10-19 PROCEDURE — 99999 PR PBB SHADOW E&M-EST. PATIENT-LVL II: ICD-10-PCS | Mod: PBBFAC,,, | Performed by: OBSTETRICS & GYNECOLOGY

## 2023-10-19 PROCEDURE — 82040 ASSAY OF SERUM ALBUMIN: CPT | Performed by: OBSTETRICS & GYNECOLOGY

## 2023-10-19 PROCEDURE — 99999 PR PBB SHADOW E&M-EST. PATIENT-LVL II: CPT | Mod: PBBFAC,,, | Performed by: OBSTETRICS & GYNECOLOGY

## 2023-10-19 PROCEDURE — 84443 ASSAY THYROID STIM HORMONE: CPT | Performed by: OBSTETRICS & GYNECOLOGY

## 2023-10-19 PROCEDURE — 99214 OFFICE O/P EST MOD 30 MIN: CPT | Mod: S$PBB,,, | Performed by: OBSTETRICS & GYNECOLOGY

## 2023-10-19 PROCEDURE — 99212 OFFICE O/P EST SF 10 MIN: CPT | Mod: PBBFAC,PO | Performed by: OBSTETRICS & GYNECOLOGY

## 2023-10-19 PROCEDURE — 84439 ASSAY OF FREE THYROXINE: CPT | Performed by: OBSTETRICS & GYNECOLOGY

## 2023-10-19 PROCEDURE — 99214 PR OFFICE/OUTPT VISIT, EST, LEVL IV, 30-39 MIN: ICD-10-PCS | Mod: S$PBB,,, | Performed by: OBSTETRICS & GYNECOLOGY

## 2023-10-19 PROCEDURE — 84480 ASSAY TRIIODOTHYRONINE (T3): CPT | Performed by: OBSTETRICS & GYNECOLOGY

## 2023-10-19 NOTE — PROGRESS NOTES
"  Subjective:   Chief Complaint:  Lump       Patient ID: Keshia Son is a  68 y.o. female.    HRT: None    Date: 10/19/2023    The patient presents today due to the following:  She reports a lump in her right armpit which has been increasing in size and "getting harder" over time.  It is not painful.  It is near an area of previous surgical intervention for post bariatric surgery excess skin removal    In addition she continues to report issues concerning for increased androgens.  She does have a history of elevated testosterone and has been seen by Endocrine and dermatology.  She discontinued previous medications prescribed due to "side effects".  Her primary concern is hair loss.    GYN & OB History  No LMP recorded. Patient is postmenopausal.     OB History    Para Term  AB Living   0 0 0 0 0 0   SAB IAB Ectopic Multiple Live Births   0 0 0 0 0         Past Medical History:   Diagnosis Date    Allergies     Androgen excess     History    Polycystic ovaries         Past Surgical History:   Procedure Laterality Date    ABDOMINOPLASTY  2021    BREAST REVISION SURGERY  2021    GASTRIC RESTRICTION SURGERY      Adrryl    REDUCTION OF BOTH BREASTS      THIGH LIFT          TOTAL BODY LIFT              Review of patient's allergies indicates:  No Known Allergies     Medications  No current outpatient medications on file.       Review of Systems (at today's evaluation)  Review of Systems   Constitutional:  Negative for fever and unexpected weight change.   Respiratory: Negative.     Cardiovascular:  Negative for chest pain and palpitations.   Gastrointestinal:  Negative for nausea and vomiting.   Endocrine: Positive for hair loss.   Genitourinary:  Negative for dysuria, hematuria and urgency.        Gyn as per HPI   Integumentary:  Positive for mole/lesion.   Neurological:  Negative for headaches.        Objective:      Vitals:    10/19/23 1051   BP: 114/76   Resp: 12 "     Physical Exam:   Constitutional: She appears well-developed and well-nourished.    HENT:   Head: Normocephalic.     Neck: No thyroid mass present.    Cardiovascular:  Normal rate.             Pulmonary/Chest: Effort normal. No respiratory distress.        Abdominal: Soft. There is no abdominal tenderness.             Musculoskeletal: Normal range of motion.      Right lower leg: No edema.      Left lower leg: No edema.       Neurological: She is alert.   No gross lesions noted.    Skin: Skin is warm and dry. Lesion noted.          Within the right adnexa is an approximately 2 x 2 cm firm palpable mass which was found to be a sebaceous cyst.  With lateral pressure a significant amount of caseous material was extruded from the cyst with subsequent resolution of the cyst.  No bleeding or other issues noted    Psychiatric: She has a normal mood and affect. Her speech is normal and behavior is normal. Mood normal.          Assessment:        1. Sebaceous cyst of axilla    2. Hair thinning      Plan:      Sebaceous cyst of axilla  Comments:  Right axilla    Hair thinning  -     Testosterone Panel; Future; Expected date: 10/19/2023  -     T3; Future; Expected date: 10/19/2023  -     Thyroid Peroxidase Antibody; Future; Expected date: 10/19/2023  -     T4, Free; Future; Expected date: 10/19/2023  -     TSH; Future; Expected date: 10/19/2023      Follow up in about 2 weeks (around 11/2/2023) for Follow-up on today's evaluation.        The above was reviewed discussed with the patient.    We discussed the findings of a right axillary sebaceous cyst and the patient tolerated drainage of the cyst without difficulty.    Sebaceous cyst in general were discussed.    We discussed the patient's history of androgen excess and at this time in regards to her hair loss thyroid and repeat testosterone levels have been ordered.  We will base further evaluation treatment of the patient's status over time and findings on laboratory  evaluation.      The patient's questions regarding the above were answered and she is in agreement with the current plan.      Giovanny Natarajan MD  OBGYN [facility]

## 2023-10-20 LAB — THYROPEROXIDASE IGG SERPL-ACNC: <6 IU/ML

## 2023-10-29 LAB
ALBUMIN SERPL-MCNC: 4.7 G/DL (ref 3.6–5.1)
SHBG SERPL-SCNC: 43 NMOL/L (ref 14–73)
TESTOST FREE SERPL-MCNC: 5.2 PG/ML (ref 0.2–5)
TESTOST SERPL-MCNC: 55 NG/DL (ref 2–45)
TESTOSTERONE.FREE+WB SERPL-MCNC: 11.1 NG/DL (ref 0.5–8.5)

## 2023-11-02 ENCOUNTER — OFFICE VISIT (OUTPATIENT)
Dept: OBSTETRICS AND GYNECOLOGY | Facility: CLINIC | Age: 68
End: 2023-11-02
Payer: MEDICARE

## 2023-11-02 ENCOUNTER — TELEPHONE (OUTPATIENT)
Dept: OBSTETRICS AND GYNECOLOGY | Facility: CLINIC | Age: 68
End: 2023-11-02
Payer: MEDICARE

## 2023-11-02 VITALS
BODY MASS INDEX: 33.68 KG/M2 | DIASTOLIC BLOOD PRESSURE: 62 MMHG | SYSTOLIC BLOOD PRESSURE: 122 MMHG | HEIGHT: 63 IN | WEIGHT: 190.06 LBS | RESPIRATION RATE: 16 BRPM

## 2023-11-02 DIAGNOSIS — L65.9 HAIR THINNING: ICD-10-CM

## 2023-11-02 DIAGNOSIS — E28.1 ANDROGEN EXCESS: Primary | ICD-10-CM

## 2023-11-02 PROCEDURE — 99213 OFFICE O/P EST LOW 20 MIN: CPT | Mod: S$PBB,,, | Performed by: OBSTETRICS & GYNECOLOGY

## 2023-11-02 PROCEDURE — 99213 OFFICE O/P EST LOW 20 MIN: CPT | Mod: PBBFAC,PO | Performed by: OBSTETRICS & GYNECOLOGY

## 2023-11-02 PROCEDURE — 99213 PR OFFICE/OUTPT VISIT, EST, LEVL III, 20-29 MIN: ICD-10-PCS | Mod: S$PBB,,, | Performed by: OBSTETRICS & GYNECOLOGY

## 2023-11-02 PROCEDURE — 99999 PR PBB SHADOW E&M-EST. PATIENT-LVL III: CPT | Mod: PBBFAC,,, | Performed by: OBSTETRICS & GYNECOLOGY

## 2023-11-02 PROCEDURE — 99999 PR PBB SHADOW E&M-EST. PATIENT-LVL III: ICD-10-PCS | Mod: PBBFAC,,, | Performed by: OBSTETRICS & GYNECOLOGY

## 2023-11-02 NOTE — PROGRESS NOTES
"  Subjective:   Chief Complaint:  Follow-up (Lab follow up)       Patient ID: Keshia Son is a  68 y.o. female.    HRT: None    Date: 10/19/2023    The patient presents today due to the following:  She reports a lump in her right armpit which has been increasing in size and "getting harder" over time.  It is not painful.  It is near an area of previous surgical intervention for post bariatric surgery excess skin removal    In addition she continues to report issues concerning for increased androgens.  She does have a history of elevated testosterone and has been seen by Endocrine and dermatology.  She discontinued previous medications prescribed due to "side effects".  Her primary concern is hair loss.    Date: 2023    The patient presents today for follow-up.  She was last seen as above.  In light of her increased androgen issues, hair loss etc. lab work was obtained and the patient presents today to discuss the results.    GYN & OB History  No LMP recorded. Patient is postmenopausal.     OB History    Para Term  AB Living   0 0 0 0 0 0   SAB IAB Ectopic Multiple Live Births   0 0 0 0 0         Past Medical History:   Diagnosis Date    Allergies     Androgen excess     History    Polycystic ovaries         Past Surgical History:   Procedure Laterality Date    ABDOMINOPLASTY  2021    BREAST REVISION SURGERY  2021    GASTRIC RESTRICTION SURGERY      Darryl    REDUCTION OF BOTH BREASTS      THIGH LIFT          TOTAL BODY LIFT              Review of patient's allergies indicates:  No Known Allergies     Medications  No current outpatient medications on file.       Review of Systems (at today's evaluation)  Review of Systems   Constitutional:  Negative for fever and unexpected weight change.   Respiratory: Negative.     Cardiovascular:  Negative for chest pain and palpitations.   Gastrointestinal:  Negative for nausea and vomiting.   Endocrine: Positive for hair " loss.   Genitourinary:  Negative for dysuria, hematuria and urgency.        Gyn as per HPI   Neurological:  Negative for headaches.        Objective:      Vitals:    11/02/23 1428   BP: 122/62   Resp: 16     Physical Exam:   Constitutional: She appears well-developed and well-nourished.    HENT:   Head: Normocephalic.     Neck: No thyroid mass present.    Cardiovascular:  Normal rate.             Pulmonary/Chest: Effort normal. No respiratory distress.        Abdominal: Soft. There is no abdominal tenderness.             Musculoskeletal: Normal range of motion.      Right lower leg: No edema.      Left lower leg: No edema.       Neurological: She is alert.   No gross lesions noted.    Skin: Skin is warm and dry.    Psychiatric: She has a normal mood and affect. Her speech is normal and behavior is normal. Mood normal.         Recent Laboratory Results:    Results    Collected Updated Procedure    10/19/2023 1127 10/29/2023 1841 Testosterone Panel [[id number]]   (Abnormal)   Blood    Component Value Units   Testosterone 55 High   ng/dL   Testosterone, Free 5.2 High   pg/mL   Testosterone, Bioavailable 11.1 High   ng/dL   Sex Hormone Binding Globulin 43  nmol/L   Albumin 4.7  g/dL          10/19/2023 1127 10/19/2023 2356 T3 [[id number]]   Blood    Component Value Units   T3, Total 94 ng/dL          10/19/2023 1127 10/20/2023 1459 Thyroid Peroxidase Antibody [[id number]]   Blood    Component Value Units   Thyroperoxidase Antibodies <6.0 IU/mL          10/19/2023 1127 10/19/2023 2356 T4, Free [[id number]]   Blood    Component Value Units   Free T4 1.06 ng/dL          10/19/2023 1127 10/19/2023 2356 TSH [[id number]]   Blood    Component Value Units   TSH 1.617 uIU/mL               Assessment:        1. Androgen excess    2. Hair thinning        Plan:      Androgen excess  -     US Pelvis Comp with Transvag NON-OB (xpd; Future; Expected date: 11/02/2023  -     Ambulatory referral/consult to Endocrinology; Future; Expected  date: 11/09/2023    Hair thinning  -     Ambulatory referral/consult to Endocrinology; Future; Expected date: 11/09/2023        No follow-ups on file.        The above was reviewed discussed with the patient.    We discussed her elevated testosterone levels.    At this time will proceed as follows:   To rule out a potential androgen secreting ovarian lesion pelvic ultrasound has been ordered.  We discussed the fact that since this is longstanding this is of low likelihood.    Referral to endocrinology has been placed for further evaluation and treatment of her androgen issues.    The patient's questions regarding the above were answered and she is in agreement with the current plan.      Giovanny Natarajan MD  OBGYN [facility]

## 2023-11-07 ENCOUNTER — PATIENT MESSAGE (OUTPATIENT)
Dept: OBSTETRICS AND GYNECOLOGY | Facility: CLINIC | Age: 68
End: 2023-11-07
Payer: MEDICARE

## 2023-11-07 ENCOUNTER — HOSPITAL ENCOUNTER (OUTPATIENT)
Dept: RADIOLOGY | Facility: HOSPITAL | Age: 68
Discharge: HOME | End: 2023-11-07
Attending: OBSTETRICS & GYNECOLOGY
Payer: MEDICARE

## 2023-11-07 DIAGNOSIS — E28.1 ANDROGEN EXCESS: ICD-10-CM

## 2023-11-08 ENCOUNTER — PATIENT MESSAGE (OUTPATIENT)
Dept: OBSTETRICS AND GYNECOLOGY | Facility: CLINIC | Age: 68
End: 2023-11-08
Payer: MEDICARE

## 2023-11-08 ENCOUNTER — TELEPHONE (OUTPATIENT)
Dept: OBSTETRICS AND GYNECOLOGY | Facility: CLINIC | Age: 68
End: 2023-11-08
Payer: MEDICARE

## 2023-11-08 DIAGNOSIS — E28.1 ANDROGEN EXCESS: ICD-10-CM

## 2023-11-08 DIAGNOSIS — L65.9 HAIR THINNING: Primary | ICD-10-CM

## 2023-11-08 NOTE — TELEPHONE ENCOUNTER
My only issue is that my testosterone is high.  I was wondering if Dr. Natarajan would be willing to treat it by prescribing low dose birth control pills to balance my hormones.  It's what an endocrinologist would do.  Even if I do get an appt. with one, it's going to be July before I can be seen.      E consult to endocrinology placed for recommendations regarding appropriate treatment options

## 2023-11-08 NOTE — TELEPHONE ENCOUNTER
Endo referral: 11/6/2023 [facility] staff message to assist with scheduling. Today this nurse spoke with patient. Talked on first available 3/2024 [facility]; no appointments sooner/later on [facility]. Discussed talking with insurance for in-network providers for direct call to schedule. Says she sent message to  asking if he would manage [facility] because of appointment situation. If he declines, she will call insurance. Patient thanks for call and information.

## 2023-11-14 ENCOUNTER — HOSPITAL ENCOUNTER (OUTPATIENT)
Dept: RADIOLOGY | Facility: HOSPITAL | Age: 68
Discharge: HOME | End: 2023-11-14
Attending: OBSTETRICS & GYNECOLOGY
Payer: MEDICARE

## 2023-11-14 ENCOUNTER — PATIENT MESSAGE (OUTPATIENT)
Dept: OBSTETRICS AND GYNECOLOGY | Facility: CLINIC | Age: 68
End: 2023-11-14
Payer: MEDICARE

## 2023-11-14 PROCEDURE — 76830 US PELVIS COMP WITH TRANSVAG NON-OB (XPD): ICD-10-PCS | Mod: 26,,, | Performed by: RADIOLOGY

## 2023-11-14 PROCEDURE — 76856 US EXAM PELVIC COMPLETE: CPT | Mod: 26,,, | Performed by: RADIOLOGY

## 2023-11-14 PROCEDURE — 76856 US PELVIS COMP WITH TRANSVAG NON-OB (XPD): ICD-10-PCS | Mod: 26,,, | Performed by: RADIOLOGY

## 2023-11-14 PROCEDURE — 76830 TRANSVAGINAL US NON-OB: CPT | Mod: 26,,, | Performed by: RADIOLOGY

## 2023-11-14 PROCEDURE — 76830 TRANSVAGINAL US NON-OB: CPT | Mod: TC,PO

## 2023-11-15 ENCOUNTER — TELEPHONE (OUTPATIENT)
Dept: OBSTETRICS AND GYNECOLOGY | Facility: CLINIC | Age: 68
End: 2023-11-15
Payer: MEDICARE

## 2023-11-15 NOTE — TELEPHONE ENCOUNTER
----- Message from Giovanny Natarajan MD sent at 11/15/2023  9:15 AM CST -----  Please contact this patient and set her up for an appointment to discuss ultrasound, elevated testosterone and potential treatment options

## 2023-11-21 ENCOUNTER — OFFICE VISIT (OUTPATIENT)
Dept: OBSTETRICS AND GYNECOLOGY | Facility: CLINIC | Age: 68
End: 2023-11-21
Payer: MEDICARE

## 2023-11-21 VITALS
SYSTOLIC BLOOD PRESSURE: 126 MMHG | BODY MASS INDEX: 34.61 KG/M2 | DIASTOLIC BLOOD PRESSURE: 68 MMHG | WEIGHT: 195.31 LBS | HEIGHT: 63 IN | RESPIRATION RATE: 12 BRPM

## 2023-11-21 DIAGNOSIS — R93.5 ABNORMAL ULTRASOUND OF ENDOMETRIUM: Primary | ICD-10-CM

## 2023-11-21 DIAGNOSIS — E28.1 ANDROGEN EXCESS: ICD-10-CM

## 2023-11-21 PROCEDURE — 99214 OFFICE O/P EST MOD 30 MIN: CPT | Mod: S$PBB,,, | Performed by: OBSTETRICS & GYNECOLOGY

## 2023-11-21 PROCEDURE — 99214 PR OFFICE/OUTPT VISIT, EST, LEVL IV, 30-39 MIN: ICD-10-PCS | Mod: S$PBB,,, | Performed by: OBSTETRICS & GYNECOLOGY

## 2023-11-21 PROCEDURE — 99213 OFFICE O/P EST LOW 20 MIN: CPT | Mod: PBBFAC,PO | Performed by: OBSTETRICS & GYNECOLOGY

## 2023-11-21 PROCEDURE — 99999 PR PBB SHADOW E&M-EST. PATIENT-LVL III: CPT | Mod: PBBFAC,,, | Performed by: OBSTETRICS & GYNECOLOGY

## 2023-11-21 PROCEDURE — 99999 PR PBB SHADOW E&M-EST. PATIENT-LVL III: ICD-10-PCS | Mod: PBBFAC,,, | Performed by: OBSTETRICS & GYNECOLOGY

## 2023-11-21 NOTE — PROGRESS NOTES
"  Subjective:   Chief Complaint:  Follow-up (Lab and u/s follow up)       Patient ID: Keshia Son is a  68 y.o. female.    HRT: None    Date: 10/19/2023    The patient presents today due to the following:  She reports a lump in her right armpit which has been increasing in size and "getting harder" over time.  It is not painful.  It is near an area of previous surgical intervention for post bariatric surgery excess skin removal    In addition she continues to report issues concerning for increased androgens.  She does have a history of elevated testosterone and has been seen by Endocrine and dermatology.  She discontinued previous medications prescribed due to "side effects".  Her primary concern is hair loss.    Date: 2023    The patient presents today for follow-up.  She was last seen as above.  In light of her increased androgen issues, hair loss etc. lab work was obtained and the patient presents today to discuss the results.    Date: 2023    The patient presents today for follow-up.  She was last seen as above.  Recent evaluation noted elevated androgen levels, testosterone.  In light of this finding a pelvic ultrasound was ordered to evaluate for ovarian abnormalities.      The patient presents today to discuss the results of the ultrasound as well as further evaluation and treatment options.  She denies any postmenopausal bleeding or pelvic pain.      GYN & OB History  No LMP recorded. Patient is postmenopausal.     OB History    Para Term  AB Living   0 0 0 0 0 0   SAB IAB Ectopic Multiple Live Births   0 0 0 0 0         Past Medical History:   Diagnosis Date    Allergies     Androgen excess     History    Polycystic ovaries         Past Surgical History:   Procedure Laterality Date    ABDOMINOPLASTY  2021    BREAST REVISION SURGERY  2021    GASTRIC RESTRICTION SURGERY      [place]    REDUCTION OF BOTH BREASTS      THIGH LIFT          TOTAL BODY LIFT   "    2007        Review of patient's allergies indicates:  No Known Allergies     Medications  No current outpatient medications on file.       Review of Systems (at today's evaluation)  Review of Systems   Constitutional:  Negative for fever and unexpected weight change.   Respiratory: Negative.     Cardiovascular:  Negative for chest pain and palpitations.   Gastrointestinal:  Negative for nausea and vomiting.   Endocrine: Positive for hair loss.   Genitourinary:  Negative for dysuria, hematuria and urgency.        Gyn as per HPI   Neurological:  Negative for headaches.        Objective:      Vitals:    11/21/23 1557   BP: 126/68   Resp: 12     Physical Exam:   Constitutional: She appears well-developed and well-nourished.    HENT:   Head: Normocephalic.     Neck: No thyroid mass present.    Cardiovascular:  Normal rate.             Pulmonary/Chest: Effort normal. No respiratory distress.        Abdominal: Soft. There is no abdominal tenderness.             Musculoskeletal: Normal range of motion.      Right lower leg: No edema.      Left lower leg: No edema.       Neurological: She is alert.   No gross lesions noted.    Skin: Skin is warm and dry.    Psychiatric: She has a normal mood and affect. Her speech is normal and behavior is normal. Mood normal.         Recent Laboratory Results:    Results    Collected Updated Procedure    10/19/2023 1127 10/29/2023 1841 Testosterone Panel [[id number]]   (Abnormal)   Blood    Component Value Units   Testosterone 55 High   ng/dL   Testosterone, Free 5.2 High   pg/mL   Testosterone, Bioavailable 11.1 High   ng/dL   Sex Hormone Binding Globulin 43  nmol/L   Albumin 4.7  g/dL          10/19/2023 1127 10/19/2023 2356 T3 [[id number]]   Blood    Component Value Units   T3, Total 94 ng/dL          10/19/2023 1127 10/20/2023 1459 Thyroid Peroxidase Antibody [[id number]]   Blood    Component Value Units   Thyroperoxidase Antibodies <6.0 IU/mL          10/19/2023 1127 10/19/2023 2356  T4, Free [[id number]]   Blood    Component Value Units   Free T4 1.06 ng/dL          10/19/2023 1127 10/19/2023 2356 TSH [[id number]]   Blood    Component Value Units   TSH 1.617 uIU/mL          Recent Radiology Results:    11/14/2023 Routine     Narrative & Impression  EXAMINATION:  US PELVIS COMP WITH TRANSVAG NON-OB (XPD)     CLINICAL HISTORY:  Androgen excess    FINDINGS:  Uterus:     Size: 6.8 cm     Masses: None     Endometrium: Thickened measures 12 mm and with tiny cystic foci within it     Right ovary:     Size: 3.3 x 2.4 x 1.5 cm volume 6.1 cc     Appearance: Normal     Vascular flow: Normal.     Left ovary:     Size: 2.5 x 2.2 x 2 cm.  Volume 5.7 cc     Appearance: Normal     Vascular Flow: Normal.     Free Fluid:     None.     Impression:     Unremarkable appearance of the ovaries.  Thickened endometrium for postmenopausal patient and with tiny cystic foci within it.     Assessment:        1. Androgen excess    2. Abnormal ultrasound of endometrium      Plan:      Androgen excess    Abnormal ultrasound of endometrium      Follow up for As needed or 2 weeks following surgery.     The above was reviewed and discussed with the patient.    We discussed we initially discussed her elevated testosterone levels.  Given the negative gynecologic evaluation at this time endocrinology evaluation was previously recommended.  The patient states she is been unable to get in to see endocrinology.  In light of this and E consult endocrinology will be placed.    We discussed the findings of a significantly thickened endometrial cavity, greater than 10 mm in a postmenopausal patient without postmenopausal bleeding.    Conservative, medical, and surgical interventions were discussed, and the patient would like to proceed with surgical intervention.  She will be scheduled for a HYSTEROSCOPY, DILATION AND CURETTAGE AND OTHER INDICATED PROCEDURES    The pros, cons, risks, benefits, alternatives, and indications of the surgical  procedure were discussed in detail with the patient.  We discussed the possibility of allergic reactions, bleeding, infection damage to cervix, uterus, bladder, ureters, bowel, and other abdominal pelvic organs.  Issues unique to this procedure were discussed.    The patient's questions regarding above were answered and she agrees with this plan.  The patient was provided with the informed consent form and given times reviewed the form.  Questions were answered and consent was obtained      Giovanny Natarajan MD  OBGYN [facility]

## 2023-11-21 NOTE — H&P (VIEW-ONLY)
"  Subjective:   Chief Complaint:  Follow-up (Lab and u/s follow up)       Patient ID: Keshia Son is a  68 y.o. female.    HRT: None    Date: 10/19/2023    The patient presents today due to the following:  She reports a lump in her right armpit which has been increasing in size and "getting harder" over time.  It is not painful.  It is near an area of previous surgical intervention for post bariatric surgery excess skin removal    In addition she continues to report issues concerning for increased androgens.  She does have a history of elevated testosterone and has been seen by Endocrine and dermatology.  She discontinued previous medications prescribed due to "side effects".  Her primary concern is hair loss.    Date: 2023    The patient presents today for follow-up.  She was last seen as above.  In light of her increased androgen issues, hair loss etc. lab work was obtained and the patient presents today to discuss the results.    Date: 2023    The patient presents today for follow-up.  She was last seen as above.  Recent evaluation noted elevated androgen levels, testosterone.  In light of this finding a pelvic ultrasound was ordered to evaluate for ovarian abnormalities.      The patient presents today to discuss the results of the ultrasound as well as further evaluation and treatment options.  She denies any postmenopausal bleeding or pelvic pain.      GYN & OB History  No LMP recorded. Patient is postmenopausal.     OB History    Para Term  AB Living   0 0 0 0 0 0   SAB IAB Ectopic Multiple Live Births   0 0 0 0 0         Past Medical History:   Diagnosis Date    Allergies     Androgen excess     History    Polycystic ovaries         Past Surgical History:   Procedure Laterality Date    ABDOMINOPLASTY  2021    BREAST REVISION SURGERY  2021    GASTRIC RESTRICTION SURGERY      [place]    REDUCTION OF BOTH BREASTS      THIGH LIFT          TOTAL BODY LIFT   "    2007        Review of patient's allergies indicates:  No Known Allergies     Medications  No current outpatient medications on file.       Review of Systems (at today's evaluation)  Review of Systems   Constitutional:  Negative for fever and unexpected weight change.   Respiratory: Negative.     Cardiovascular:  Negative for chest pain and palpitations.   Gastrointestinal:  Negative for nausea and vomiting.   Endocrine: Positive for hair loss.   Genitourinary:  Negative for dysuria, hematuria and urgency.        Gyn as per HPI   Neurological:  Negative for headaches.        Objective:      Vitals:    11/21/23 1557   BP: 126/68   Resp: 12     Physical Exam:   Constitutional: She appears well-developed and well-nourished.    HENT:   Head: Normocephalic.     Neck: No thyroid mass present.    Cardiovascular:  Normal rate.             Pulmonary/Chest: Effort normal. No respiratory distress.        Abdominal: Soft. There is no abdominal tenderness.             Musculoskeletal: Normal range of motion.      Right lower leg: No edema.      Left lower leg: No edema.       Neurological: She is alert.   No gross lesions noted.    Skin: Skin is warm and dry.    Psychiatric: She has a normal mood and affect. Her speech is normal and behavior is normal. Mood normal.         Recent Laboratory Results:    Results    Collected Updated Procedure    10/19/2023 1127 10/29/2023 1841 Testosterone Panel [[id number]]   (Abnormal)   Blood    Component Value Units   Testosterone 55 High   ng/dL   Testosterone, Free 5.2 High   pg/mL   Testosterone, Bioavailable 11.1 High   ng/dL   Sex Hormone Binding Globulin 43  nmol/L   Albumin 4.7  g/dL          10/19/2023 1127 10/19/2023 2356 T3 [[id number]]   Blood    Component Value Units   T3, Total 94 ng/dL          10/19/2023 1127 10/20/2023 1459 Thyroid Peroxidase Antibody [[id number]]   Blood    Component Value Units   Thyroperoxidase Antibodies <6.0 IU/mL          10/19/2023 1127 10/19/2023 2356  T4, Free [[id number]]   Blood    Component Value Units   Free T4 1.06 ng/dL          10/19/2023 1127 10/19/2023 2356 TSH [[id number]]   Blood    Component Value Units   TSH 1.617 uIU/mL          Recent Radiology Results:    11/14/2023 Routine     Narrative & Impression  EXAMINATION:  US PELVIS COMP WITH TRANSVAG NON-OB (XPD)     CLINICAL HISTORY:  Androgen excess    FINDINGS:  Uterus:     Size: 6.8 cm     Masses: None     Endometrium: Thickened measures 12 mm and with tiny cystic foci within it     Right ovary:     Size: 3.3 x 2.4 x 1.5 cm volume 6.1 cc     Appearance: Normal     Vascular flow: Normal.     Left ovary:     Size: 2.5 x 2.2 x 2 cm.  Volume 5.7 cc     Appearance: Normal     Vascular Flow: Normal.     Free Fluid:     None.     Impression:     Unremarkable appearance of the ovaries.  Thickened endometrium for postmenopausal patient and with tiny cystic foci within it.     Assessment:        1. Androgen excess    2. Abnormal ultrasound of endometrium      Plan:      Androgen excess    Abnormal ultrasound of endometrium      Follow up for As needed or 2 weeks following surgery.     The above was reviewed and discussed with the patient.    We discussed we initially discussed her elevated testosterone levels.  Given the negative gynecologic evaluation at this time endocrinology evaluation was previously recommended.  The patient states she is been unable to get in to see endocrinology.  In light of this and E consult endocrinology will be placed.    We discussed the findings of a significantly thickened endometrial cavity, greater than 10 mm in a postmenopausal patient without postmenopausal bleeding.    Conservative, medical, and surgical interventions were discussed, and the patient would like to proceed with surgical intervention.  She will be scheduled for a HYSTEROSCOPY, DILATION AND CURETTAGE AND OTHER INDICATED PROCEDURES    The pros, cons, risks, benefits, alternatives, and indications of the surgical  procedure were discussed in detail with the patient.  We discussed the possibility of allergic reactions, bleeding, infection damage to cervix, uterus, bladder, ureters, bowel, and other abdominal pelvic organs.  Issues unique to this procedure were discussed.    The patient's questions regarding above were answered and she agrees with this plan.  The patient was provided with the informed consent form and given times reviewed the form.  Questions were answered and consent was obtained      Giovanny Natarajan MD  OBGYN [facility]

## 2023-11-22 RX ORDER — DOXYCYCLINE HYCLATE 100 MG
100 TABLET ORAL
Status: CANCELLED | OUTPATIENT
Start: 2023-11-22 | End: 2023-11-23

## 2023-11-22 RX ORDER — MUPIROCIN 20 MG/G
OINTMENT TOPICAL
Status: CANCELLED | OUTPATIENT
Start: 2023-11-22

## 2023-11-22 RX ORDER — SODIUM CHLORIDE 9 MG/ML
INJECTION, SOLUTION INTRAVENOUS CONTINUOUS
Status: CANCELLED | OUTPATIENT
Start: 2023-11-22

## 2023-11-28 ENCOUNTER — TELEPHONE (OUTPATIENT)
Dept: OBSTETRICS AND GYNECOLOGY | Facility: CLINIC | Age: 68
End: 2023-11-28
Payer: MEDICARE

## 2023-11-28 NOTE — TELEPHONE ENCOUNTER
Contacted pt and notified of upcoming procedure 12/11/23. Post-op appt scheduled and questions answered. Pt voiced understanding.

## 2023-12-06 DIAGNOSIS — Z01.818 PREOP TESTING: Primary | ICD-10-CM

## 2023-12-08 ENCOUNTER — HOSPITAL ENCOUNTER (OUTPATIENT)
Dept: CARDIOLOGY | Facility: HOSPITAL | Age: 68
Discharge: HOME | End: 2023-12-08
Attending: ANESTHESIOLOGY
Payer: MEDICARE

## 2023-12-08 DIAGNOSIS — Z01.818 PREOP TESTING: ICD-10-CM

## 2023-12-08 PROCEDURE — 93005 ELECTROCARDIOGRAM TRACING: CPT

## 2023-12-08 PROCEDURE — 93010 ELECTROCARDIOGRAM REPORT: CPT | Mod: ,,, | Performed by: INTERNAL MEDICINE

## 2023-12-08 PROCEDURE — 93010 EKG 12-LEAD: ICD-10-PCS | Mod: ,,, | Performed by: INTERNAL MEDICINE

## 2023-12-08 NOTE — DISCHARGE INSTRUCTIONS
No douches, tampons, intercourse or tub baths for 1 week;     Some bleeding and pain is expected, but should be no greater than a normal period. If heavy bleeding or pain persists, please contact your doctor;     Keep followup appointment as scheduled.     Do not drive or make important business or legal decisions for 24 hours.     Call MD if severe bleeding, fever > 101, nausea/vomitting    Rx for pain:  Tylenol and prescription strength Motrin

## 2023-12-10 ENCOUNTER — ANESTHESIA EVENT (OUTPATIENT)
Dept: SURGERY | Facility: HOSPITAL | Age: 68
End: 2023-12-10
Payer: MEDICARE

## 2023-12-11 ENCOUNTER — ANESTHESIA (OUTPATIENT)
Dept: SURGERY | Facility: HOSPITAL | Age: 68
End: 2023-12-11
Payer: MEDICARE

## 2023-12-11 ENCOUNTER — HOSPITAL ENCOUNTER (OUTPATIENT)
Facility: HOSPITAL | Age: 68
Discharge: HOME | End: 2023-12-11
Attending: OBSTETRICS & GYNECOLOGY | Admitting: OBSTETRICS & GYNECOLOGY
Payer: MEDICARE

## 2023-12-11 VITALS
DIASTOLIC BLOOD PRESSURE: 66 MMHG | OXYGEN SATURATION: 97 % | WEIGHT: 183 LBS | SYSTOLIC BLOOD PRESSURE: 121 MMHG | TEMPERATURE: 98 F | HEART RATE: 52 BPM | BODY MASS INDEX: 32.43 KG/M2 | RESPIRATION RATE: 17 BRPM | HEIGHT: 63 IN

## 2023-12-11 DIAGNOSIS — R93.5 ABNORMAL ULTRASOUND OF ENDOMETRIUM: ICD-10-CM

## 2023-12-11 PROBLEM — E28.1 ANDROGEN EXCESS: Status: ACTIVE | Noted: 2023-12-11

## 2023-12-11 PROCEDURE — 36000707: Performed by: OBSTETRICS & GYNECOLOGY

## 2023-12-11 PROCEDURE — 58558 PR HYSTEROSCOPY,W/ENDO BX: ICD-10-PCS | Mod: ,,, | Performed by: OBSTETRICS & GYNECOLOGY

## 2023-12-11 PROCEDURE — 25000003 PHARM REV CODE 250: Performed by: ANESTHESIOLOGY

## 2023-12-11 PROCEDURE — 37000008 HC ANESTHESIA 1ST 15 MINUTES: Performed by: OBSTETRICS & GYNECOLOGY

## 2023-12-11 PROCEDURE — 88305 TISSUE EXAM BY PATHOLOGIST: CPT | Mod: TC | Performed by: PATHOLOGY

## 2023-12-11 PROCEDURE — C1782 MORCELLATOR: HCPCS | Performed by: OBSTETRICS & GYNECOLOGY

## 2023-12-11 PROCEDURE — 36000706: Performed by: OBSTETRICS & GYNECOLOGY

## 2023-12-11 PROCEDURE — 63600175 PHARM REV CODE 636 W HCPCS: Performed by: NURSE ANESTHETIST, CERTIFIED REGISTERED

## 2023-12-11 PROCEDURE — 58558 HYSTEROSCOPY BIOPSY: CPT | Mod: ,,, | Performed by: OBSTETRICS & GYNECOLOGY

## 2023-12-11 PROCEDURE — 71000039 HC RECOVERY, EACH ADD'L HOUR: Performed by: OBSTETRICS & GYNECOLOGY

## 2023-12-11 PROCEDURE — 37000009 HC ANESTHESIA EA ADD 15 MINS: Performed by: OBSTETRICS & GYNECOLOGY

## 2023-12-11 PROCEDURE — 63600175 PHARM REV CODE 636 W HCPCS: Performed by: ANESTHESIOLOGY

## 2023-12-11 PROCEDURE — 27201423 OPTIME MED/SURG SUP & DEVICES STERILE SUPPLY: Performed by: OBSTETRICS & GYNECOLOGY

## 2023-12-11 PROCEDURE — 71000033 HC RECOVERY, INTIAL HOUR: Performed by: OBSTETRICS & GYNECOLOGY

## 2023-12-11 PROCEDURE — 25000003 PHARM REV CODE 250: Performed by: OBSTETRICS & GYNECOLOGY

## 2023-12-11 PROCEDURE — 25000003 PHARM REV CODE 250: Performed by: NURSE ANESTHETIST, CERTIFIED REGISTERED

## 2023-12-11 PROCEDURE — 27200651 HC AIRWAY, LMA: Performed by: ANESTHESIOLOGY

## 2023-12-11 RX ORDER — MIDAZOLAM HYDROCHLORIDE 1 MG/ML
INJECTION INTRAMUSCULAR; INTRAVENOUS
Status: DISCONTINUED | OUTPATIENT
Start: 2023-12-11 | End: 2023-12-11

## 2023-12-11 RX ORDER — MUPIROCIN 20 MG/G
OINTMENT TOPICAL
Status: DISCONTINUED | OUTPATIENT
Start: 2023-12-11 | End: 2023-12-11 | Stop reason: HOSPADM

## 2023-12-11 RX ORDER — PROCHLORPERAZINE EDISYLATE 5 MG/ML
5 INJECTION INTRAMUSCULAR; INTRAVENOUS EVERY 4 HOURS PRN
Status: DISCONTINUED | OUTPATIENT
Start: 2023-12-11 | End: 2023-12-11 | Stop reason: HOSPADM

## 2023-12-11 RX ORDER — IBUPROFEN 600 MG/1
600 TABLET ORAL EVERY 6 HOURS PRN
Qty: 40 TABLET | Refills: 0 | Status: ON HOLD | OUTPATIENT
Start: 2023-12-11 | End: 2024-01-19 | Stop reason: HOSPADM

## 2023-12-11 RX ORDER — MEPERIDINE HYDROCHLORIDE 50 MG/ML
12.5 INJECTION INTRAMUSCULAR; INTRAVENOUS; SUBCUTANEOUS ONCE
Status: DISCONTINUED | OUTPATIENT
Start: 2023-12-11 | End: 2023-12-11 | Stop reason: HOSPADM

## 2023-12-11 RX ORDER — KETOROLAC TROMETHAMINE 30 MG/ML
INJECTION, SOLUTION INTRAMUSCULAR; INTRAVENOUS
Status: DISCONTINUED | OUTPATIENT
Start: 2023-12-11 | End: 2023-12-11

## 2023-12-11 RX ORDER — SODIUM CHLORIDE 9 MG/ML
INJECTION, SOLUTION INTRAVENOUS CONTINUOUS
Status: DISCONTINUED | OUTPATIENT
Start: 2023-12-11 | End: 2023-12-11

## 2023-12-11 RX ORDER — HYDROMORPHONE HYDROCHLORIDE 1 MG/ML
0.2 INJECTION, SOLUTION INTRAMUSCULAR; INTRAVENOUS; SUBCUTANEOUS EVERY 5 MIN PRN
Status: DISCONTINUED | OUTPATIENT
Start: 2023-12-11 | End: 2023-12-11 | Stop reason: HOSPADM

## 2023-12-11 RX ORDER — DIPHENHYDRAMINE HYDROCHLORIDE 50 MG/ML
25 INJECTION INTRAMUSCULAR; INTRAVENOUS EVERY 6 HOURS PRN
Status: DISCONTINUED | OUTPATIENT
Start: 2023-12-11 | End: 2023-12-11 | Stop reason: HOSPADM

## 2023-12-11 RX ORDER — OXYCODONE HYDROCHLORIDE 5 MG/1
5 TABLET ORAL
Status: DISCONTINUED | OUTPATIENT
Start: 2023-12-11 | End: 2023-12-11 | Stop reason: HOSPADM

## 2023-12-11 RX ORDER — ACETAMINOPHEN 10 MG/ML
INJECTION, SOLUTION INTRAVENOUS
Status: DISCONTINUED | OUTPATIENT
Start: 2023-12-11 | End: 2023-12-11

## 2023-12-11 RX ORDER — DEXAMETHASONE SODIUM PHOSPHATE 4 MG/ML
INJECTION, SOLUTION INTRA-ARTICULAR; INTRALESIONAL; INTRAMUSCULAR; INTRAVENOUS; SOFT TISSUE
Status: DISCONTINUED | OUTPATIENT
Start: 2023-12-11 | End: 2023-12-11

## 2023-12-11 RX ORDER — FENTANYL CITRATE 50 UG/ML
25 INJECTION, SOLUTION INTRAMUSCULAR; INTRAVENOUS EVERY 5 MIN PRN
Status: DISCONTINUED | OUTPATIENT
Start: 2023-12-11 | End: 2023-12-11 | Stop reason: HOSPADM

## 2023-12-11 RX ORDER — ONDANSETRON 2 MG/ML
INJECTION INTRAMUSCULAR; INTRAVENOUS
Status: DISCONTINUED | OUTPATIENT
Start: 2023-12-11 | End: 2023-12-11

## 2023-12-11 RX ORDER — LIDOCAINE HYDROCHLORIDE 20 MG/ML
INJECTION INTRAVENOUS
Status: DISCONTINUED | OUTPATIENT
Start: 2023-12-11 | End: 2023-12-11

## 2023-12-11 RX ORDER — LIDOCAINE HYDROCHLORIDE 10 MG/ML
0.5 INJECTION, SOLUTION EPIDURAL; INFILTRATION; INTRACAUDAL; PERINEURAL ONCE
Status: COMPLETED | OUTPATIENT
Start: 2023-12-11 | End: 2023-12-11

## 2023-12-11 RX ORDER — PROPOFOL 10 MG/ML
VIAL (ML) INTRAVENOUS
Status: DISCONTINUED | OUTPATIENT
Start: 2023-12-11 | End: 2023-12-11

## 2023-12-11 RX ORDER — FENTANYL CITRATE 50 UG/ML
INJECTION, SOLUTION INTRAMUSCULAR; INTRAVENOUS
Status: DISCONTINUED | OUTPATIENT
Start: 2023-12-11 | End: 2023-12-11

## 2023-12-11 RX ORDER — SODIUM CHLORIDE 0.9 G/100ML
IRRIGANT IRRIGATION
Status: DISCONTINUED | OUTPATIENT
Start: 2023-12-11 | End: 2023-12-11 | Stop reason: HOSPADM

## 2023-12-11 RX ORDER — ONDANSETRON 2 MG/ML
4 INJECTION INTRAMUSCULAR; INTRAVENOUS ONCE
Status: DISCONTINUED | OUTPATIENT
Start: 2023-12-11 | End: 2023-12-11 | Stop reason: HOSPADM

## 2023-12-11 RX ORDER — DOXYCYCLINE HYCLATE 100 MG
100 TABLET ORAL
Status: COMPLETED | OUTPATIENT
Start: 2023-12-11 | End: 2023-12-11

## 2023-12-11 RX ORDER — SODIUM CHLORIDE, SODIUM LACTATE, POTASSIUM CHLORIDE, CALCIUM CHLORIDE 600; 310; 30; 20 MG/100ML; MG/100ML; MG/100ML; MG/100ML
INJECTION, SOLUTION INTRAVENOUS CONTINUOUS
Status: DISCONTINUED | OUTPATIENT
Start: 2023-12-11 | End: 2023-12-11 | Stop reason: HOSPADM

## 2023-12-11 RX ADMIN — ONDANSETRON 8 MG: 2 INJECTION, SOLUTION INTRAMUSCULAR; INTRAVENOUS at 07:12

## 2023-12-11 RX ADMIN — ACETAMINOPHEN 1000 MG: 10 INJECTION, SOLUTION INTRAVENOUS at 07:12

## 2023-12-11 RX ADMIN — DOXYCYCLINE HYCLATE 100 MG: 100 TABLET, COATED ORAL at 07:12

## 2023-12-11 RX ADMIN — DEXAMETHASONE SODIUM PHOSPHATE 4 MG: 4 INJECTION, SOLUTION INTRAMUSCULAR; INTRAVENOUS at 07:12

## 2023-12-11 RX ADMIN — MIDAZOLAM 2 MG: 1 INJECTION INTRAMUSCULAR; INTRAVENOUS at 07:12

## 2023-12-11 RX ADMIN — PROPOFOL 160 MG: 10 INJECTION, EMULSION INTRAVENOUS at 07:12

## 2023-12-11 RX ADMIN — LIDOCAINE HYDROCHLORIDE 100 MG: 20 INJECTION, SOLUTION INTRAVENOUS at 07:12

## 2023-12-11 RX ADMIN — LIDOCAINE HYDROCHLORIDE 5 MG: 10 INJECTION, SOLUTION EPIDURAL; INFILTRATION; INTRACAUDAL; PERINEURAL at 07:12

## 2023-12-11 RX ADMIN — FENTANYL CITRATE 50 MCG: 50 INJECTION, SOLUTION INTRAMUSCULAR; INTRAVENOUS at 07:12

## 2023-12-11 RX ADMIN — OXYCODONE 5 MG: 5 TABLET ORAL at 08:12

## 2023-12-11 RX ADMIN — KETOROLAC TROMETHAMINE 30 MG: 30 INJECTION, SOLUTION INTRAMUSCULAR; INTRAVENOUS at 08:12

## 2023-12-11 RX ADMIN — SODIUM CHLORIDE, POTASSIUM CHLORIDE, SODIUM LACTATE AND CALCIUM CHLORIDE: 600; 310; 30; 20 INJECTION, SOLUTION INTRAVENOUS at 07:12

## 2023-12-11 RX ADMIN — MUPIROCIN: 20 OINTMENT TOPICAL at 07:12

## 2023-12-11 NOTE — OP NOTE
[facility]  Department of Obstetrics & Gynecology  Operative Note      PATIENT NAME: Keshia Son    MRN: [id number]  TODAY'S DATE: 12/11/2023  ADMIT DATE: 12/11/2023                              OPERATIVE REPORT:  12/11/2023    SURGEON: Giovanny Natarajan MD    ASSISTANT:  None    PREOPERATIVE DIAGNOSIS:    Abnormal ultrasound of endometrial cavity   Androgen excess    POSTOPERATIVE DIAGNOSIS:     Abnormal ultrasound of endometrial cavity    Androgen excess    PROCEDURE:   1.  Hysteroscopy  2.  Dilation and curettage (via MyoSure Reach device) with resection of probable myoma, possible polyps    ANESTHESIA: General anesthesia with LMA     ESTIMATED BLOOD LOSS: Minimal     FLUIDS: 800 mL of crystalloid.    URINE OUTPUT: 25 cc    PATHOLOGY:  Endometrial curettage    FINDINGS:   Atrophic otherwise normal-appearing vaginal vault, cervix and endocervical canal.     The uterus sounded to 8-9 cm.  Within the endometrial cavity a number of calcified appearing lesions compatible with either myoma or polyps were visualized.  Bilateral tubal ostia visualized.         PROCEDURE IN DETAIL:   Prior to the procedure the patient was seen and evaluated in the preoperative area.  Potential risks associated with the surgery were once again reviewed and discussed.  The patient's questions were answered and she is in agreement with proceeding with the current plan..    The patient was subsequently taken to the [facility] operating room, where general anesthesia with LMA was initiated by the anesthesia department.      The patient was prepped and draped in the usual sterile fashion in the dorsal lithotomy position in the [facility].  The bladder was drained of approximately 25 cc of clear urine.      At this time a time-out/safety procedure was performed with all participating parties in agreement as to the preoperative and operative plan.  Sequential compression hose were on the patient.    A  bivalved speculum was introduced into the vagina and the anterior lip of the cervix was grasped with a the single-tooth tenaculum. The uterus was gently sounded and the cervix was gently dilated to allow for the hysteroscope.     At this time hysteroscopic evaluation of the endometrial cavity was performed with the findings as mentioned above noted.    Following visualization of all four quadrants of the endometrial cavity, a curettage was performed under direct visualization.  Curettage included resection of all polypoid type lesions.  Following curettage, good hemostasis with an intact appearing cavity were noted.      The hysteroscope was then removed, following which, the tenaculum was removed from the anterior cervix with good hemostasis noted from the tenaculum site as well as the cervical os..     Final fluid deficit: 95 cc.    Final count was reported as correct per nursing.  The patient tolerated the procedure well.    Giovanny Natarajan M.D., FACOG

## 2023-12-11 NOTE — ANESTHESIA PREPROCEDURE EVALUATION
12/11/2023  Keshia Son is a 68 y.o., female.      Pre-op Assessment    I have reviewed the Patient Summary Reports.     I have reviewed the Nursing Notes.       Review of Systems  Anesthesia Hx:  No problems with previous Anesthesia                Cardiovascular:  Cardiovascular Normal                                            Pulmonary:  Pulmonary Normal                           Physical Exam  General: Well nourished    Airway:  Mallampati: II   Mouth Opening: Normal  TM Distance: Normal  Neck ROM: Normal ROM    Chest/Lungs:  Normal Respiratory Rate    Heart:  Rate: Normal  Rhythm: Regular Rhythm        Anesthesia Plan  Type of Anesthesia, risks & benefits discussed:    Anesthesia Type: Gen ETT, Gen Supraglottic Airway  Intra-op Monitoring Plan: Standard ASA Monitors  Post Op Pain Control Plan: multimodal analgesia and IV/PO Opioids PRN  Induction:  IV  Airway Plan: Video and Direct  Informed Consent: Informed consent signed with the Patient and all parties understand the risks and agree with anesthesia plan.  All questions answered. Patient consented to blood products? No  ASA Score: 2  Day of Surgery Review of History & Physical: H&P Update referred to the surgeon/provider.    Ready For Surgery From Anesthesia Perspective.     .

## 2023-12-11 NOTE — DISCHARGE SUMMARY
[facility] - Periop Services  Discharge Note  Short Stay    Procedure(s) (LRB):  HYSTEROSCOPY, WITH DILATION AND CURETTAGE OF UTERUS and other indicated procedures (N/A)      OUTCOME: Patient tolerated treatment/procedure well without complication and is now ready for discharge.    DISPOSITION: Home or Self Care    FINAL DIAGNOSIS:  Abnormal ultrasound of endometrium / endometrial myoma/polyp    FOLLOWUP: In clinic    DISCHARGE INSTRUCTIONS:    Discharge Procedure Orders   Diet general     Sponge bath only until clinic visit     Keep surgical extremity elevated     Ice to affected area     Lifting restrictions     Other restrictions (specify):   Order Comments: Discharge Instructions:  Follow-up in 2 weeks or as needed.  Call immediately for any abnormal pain bleeding fever chills nausea vomiting or other significant postoperative issues.    Pelvic rest until follow-up.  No tub baths until follow-up.  Diet as tolerated     Call MD for:  temperature >100.4     Call MD for:  persistent nausea and vomiting     Call MD for:  severe uncontrolled pain     Call MD for:  difficulty breathing, headache or visual disturbances     Call MD for:  redness, tenderness, or signs of infection (pain, swelling, redness, odor or green/yellow discharge around incision site)     Call MD for:  hives     Call MD for:  persistent dizziness or light-headedness     Call MD for:  extreme fatigue     Call MD for:   Order Comments: Discharge Instructions:  Follow-up in 2 weeks or as needed.  Call immediately for any abnormal pain bleeding fever chills nausea vomiting or other significant postoperative issues.    Pelvic rest until follow-up.  No tub baths until follow-up.  Diet as tolerated     No dressing needed     Activity as tolerated     Shower on day dressing removed (No bath)   Order Comments: No tub baths until patient seen for postoperative evaluation in the office         Clinical Reference Documents Added to Patient  Instructions         Document    HYSTEROSCOPY DISCHARGE INSTRUCTIONS (ENGLISH)            TIME SPENT ON DISCHARGE: 10 minutes    Discharge medications:  Over-the-counter Tylenol and prescription Motrin.    The patient may be discharged home when she is ambulating, tolerating clear liquids, able to take pain medications by mouth, has no significant nausea and is in agreement with discharge plan.      Giovanny Natarajan MD  Department OBGYN  [facility]

## 2023-12-11 NOTE — INTERVAL H&P NOTE
The patient has been examined and the H&P has been reviewed:    I concur with the findings and no changes have occurred since H&P was written.    Surgery risks, benefits and alternative options discussed and understood by patient/family.    Impression:  Unremarkable appearance of the ovaries.  Thickened endometrium for postmenopausal patient and with tiny cystic foci within it.    Assessment:  1.            Androgen excess   2.            Abnormal ultrasound of endometrium     Plan:a HYSTEROSCOPY, DILATION AND CURETTAGE AND OTHER INDICATED PROCEDURES      There are no hospital problems to display for this patient.

## 2023-12-11 NOTE — PLAN OF CARE
Patient is being driven home by Maura. She is at bedside for instructions.  Dr Natarajan spoker with patient post procedure.

## 2023-12-11 NOTE — TRANSFER OF CARE
"Anesthesia Transfer of Care Note    Patient: Keshia Son    Procedure(s) Performed: Procedure(s) (LRB):  HYSTEROSCOPY, WITH DILATION AND CURETTAGE OF UTERUS and other indicated procedures (N/A)    Patient location: PACU    Anesthesia Type: regional    Transport from OR: Transported from OR on 2-3 L/min O2 by NC with adequate spontaneous ventilation    Post pain: adequate analgesia    Post assessment: no apparent anesthetic complications    Post vital signs: stable    Level of consciousness: sedated and responds to stimulation    Nausea/Vomiting: no nausea/vomiting    Complications: none    Transfer of care protocol was followed      Last vitals: Visit Vitals  /62   Pulse 80   Temp 36.4 °C (97.5 °F) (Skin)   Resp 19   Ht 5' 3" (1.6 m)   Wt 83 kg (183 lb)   SpO2 96%   Breastfeeding No   BMI 32.42 kg/m²     "

## 2023-12-11 NOTE — ANESTHESIA PROCEDURE NOTES
Intubation    Date/Time: 12/11/2023 7:41 AM    Performed by: Thuy Kim CRNA  Authorized by: Chen Castillo MD    Intubation:     Induction:  Intravenous    Intubated:  Postinduction    Mask Ventilation:  Easy mask    Attempts:  1    Attempted By:  CRNA    Method of Intubation:  Video laryngoscopy    Blade:  Gaspar 3    Laryngeal View Grade: Grade I - full view of cords      Difficult Airway Encountered?: No      Complications:  None    Airway Device:  Supraglottic airway/LMA    Airway Device Size:  3.0    Style/Cuff Inflation:  Cuffed (inflated to minimal occlusive pressure)    Inflation Amount (mL):  5    Tube secured:  20    Secured at:  The lips    Placement Verified By:  Capnometry    Complicating Factors:  Obesity    Findings Post-Intubation:  BS equal bilateral and atraumatic/condition of teeth unchanged

## 2023-12-12 DIAGNOSIS — N71.9 ENDOMETRITIS: Primary | ICD-10-CM

## 2023-12-12 RX ORDER — DOXYCYCLINE 100 MG/1
100 CAPSULE ORAL 2 TIMES DAILY
Qty: 14 CAPSULE | Refills: 0 | Status: SHIPPED | OUTPATIENT
Start: 2023-12-12 | End: 2023-12-19

## 2023-12-12 NOTE — ANESTHESIA POSTPROCEDURE EVALUATION
Anesthesia Post Evaluation    Patient: Keshia Son    Procedure(s) Performed: Procedure(s) (LRB):  HYSTEROSCOPY, WITH DILATION AND CURETTAGE OF UTERUS and other indicated procedures (N/A)    Final Anesthesia Type: general      Patient location during evaluation: PACU  Patient participation: Yes- Able to Participate  Level of consciousness: awake and alert  Post-procedure vital signs: reviewed and stable  Pain management: adequate  Airway patency: patent    PONV status at discharge: No PONV  Anesthetic complications: no      Cardiovascular status: blood pressure returned to baseline  Respiratory status: unassisted and room air  Hydration status: euvolemic  Follow-up not needed.              Vitals Value Taken Time   /66 12/11/23 0900   Temp 36.4 °C (97.5 °F) 12/11/23 0815   Pulse 52 12/11/23 0905   Resp 17 12/11/23 0905   SpO2 97 % 12/11/23 0905         Event Time   Out of Recovery 09:18:00         Pain/Ana M Score: Pain Rating Prior to Med Admin: 5 (12/11/2023  8:46 AM)  Pain Rating Post Med Admin: 0 (12/11/2023  9:15 AM)  Ana M Score: 10 (12/11/2023  8:29 AM)

## 2024-01-02 ENCOUNTER — OFFICE VISIT (OUTPATIENT)
Dept: OBSTETRICS AND GYNECOLOGY | Facility: CLINIC | Age: 69
End: 2024-01-02
Payer: MEDICARE

## 2024-01-02 VITALS
BODY MASS INDEX: 34.88 KG/M2 | HEIGHT: 63 IN | SYSTOLIC BLOOD PRESSURE: 130 MMHG | DIASTOLIC BLOOD PRESSURE: 78 MMHG | WEIGHT: 196.88 LBS

## 2024-01-02 DIAGNOSIS — N85.00 ENDOMETRIAL HYPERPLASIA WITHOUT ATYPIA: Primary | ICD-10-CM

## 2024-01-02 PROCEDURE — 99999 PR PBB SHADOW E&M-EST. PATIENT-LVL II: CPT | Mod: PBBFAC,,, | Performed by: OBSTETRICS & GYNECOLOGY

## 2024-01-02 PROCEDURE — 99214 OFFICE O/P EST MOD 30 MIN: CPT | Mod: S$PBB,,, | Performed by: OBSTETRICS & GYNECOLOGY

## 2024-01-02 PROCEDURE — 99212 OFFICE O/P EST SF 10 MIN: CPT | Mod: PBBFAC,PO | Performed by: OBSTETRICS & GYNECOLOGY

## 2024-01-02 RX ORDER — SODIUM CHLORIDE 9 MG/ML
INJECTION, SOLUTION INTRAVENOUS CONTINUOUS
Status: CANCELLED | OUTPATIENT
Start: 2024-01-02

## 2024-01-02 RX ORDER — CEFAZOLIN SODIUM 2 G/50ML
2 SOLUTION INTRAVENOUS
Status: CANCELLED | OUTPATIENT
Start: 2024-01-02

## 2024-01-02 RX ORDER — MUPIROCIN 20 MG/G
OINTMENT TOPICAL
Status: CANCELLED | OUTPATIENT
Start: 2024-01-02

## 2024-01-02 RX ORDER — PHENAZOPYRIDINE HYDROCHLORIDE 200 MG/1
200 TABLET, FILM COATED ORAL
Status: CANCELLED | OUTPATIENT
Start: 2024-01-02 | End: 2024-01-02

## 2024-01-02 NOTE — PROGRESS NOTES
Subjective:   Chief Complaint:  No chief complaint on file.       Patient ID: eKshia Son is a  68 y.o. female.    Date: 2024     The patient is status post: Hysteroscopy, dilation curettage  on 2023.  Surgery Indication(s):  Thickened endometrial cavity on pelvic ultrasound    From a postoperative standpoint she is currently doing well and without complaints.    She denies any postoperative fevers.   No significant postoperative GI or urologic issues.    No significant postoperative pain.   No significant postoperative bleeding.     Pathology: DIAGNOSIS:     ENDOMETRIUM, CURETTAGE:   - ENDOMETRIAL POLYP WITH SIMPLE (NON-ATYPICAL) HYPERPLASIA   - CHRONIC FOLLICULAR LYMPHOPLASMACYTIC ENDOMETRITIS   - NEGATIVE FOR DYSPLASIA OR MALIGNANCY         GYN & OB History  No LMP recorded. Patient is postmenopausal.     Date of Last Pap: No result found    OB History    Para Term  AB Living   0 0 0 0 0 0   SAB IAB Ectopic Multiple Live Births   0 0 0 0 0       Past Medical History:   Diagnosis Date    Abnormal Pap smear of cervix     Allergies     Androgen excess     History    Polycystic ovaries         Past Surgical History:   Procedure Laterality Date    ABDOMINOPLASTY  2021    BREAST REVISION SURGERY  2021    DILATION AND CURETTAGE OF UTERUS      GASTRIC RESTRICTION SURGERY      [facility]    HYSTEROSCOPY WITH DILATION AND CURETTAGE OF UTERUS N/A 2023    Procedure: HYSTEROSCOPY, WITH DILATION AND CURETTAGE OF UTERUS and other indicated procedures;  Surgeon: Giovanny Natarajan MD;  Location: [facility];  Service: OB/GYN;  Laterality: N/A;    REDUCTION OF BOTH BREASTS  2010    THIGH LIFT          TOTAL BODY LIFT              Review of Systems  Review of Systems   Constitutional:  Negative for fever.        As per HPI, otherwise no significant postoperative unexpected issues are noted   Respiratory: Negative.  Negative for shortness of breath.    Cardiovascular:   Negative for chest pain and palpitations.   Gastrointestinal:  Negative for abdominal pain, constipation, nausea and vomiting.   Genitourinary:  Negative for dysuria and hematuria.   Neurological: Negative.         Objective:      Vitals:    01/02/24 1608   BP: 130/78     Physical Exam:   Constitutional: She appears well-developed and well-nourished.    HENT:   Head: Normocephalic.     Neck: No thyroid mass present.    Cardiovascular:  Normal rate.             Pulmonary/Chest: Effort normal. No respiratory distress.        Abdominal: Soft. There is no abdominal tenderness.             Musculoskeletal: Normal range of motion.      Right lower leg: No edema.      Left lower leg: No edema.       Neurological: She is alert.   No gross lesions noted.    Skin: Skin is warm and dry.    Psychiatric: She has a normal mood and affect. Her speech is normal and behavior is normal. Mood normal.          Assessment:        1. Endometrial hyperplasia without atypia          Plan:      Endometrial hyperplasia without atypia       Follow up for As needed or 2 weeks following surgery.     The above was reviewed discussed with the patient.  We discussed the findings that time of surgery as well as pathology.  We reviewed the photographic images.    From a postoperative standpoint the patient is currently doing well.    The above was reviewed and discussed with the patient.    We discussed the significance of endometrial hyperplasia without atypia as well as hyperplasia with atypia..    Conservative, medical (oral progesterone and the progesterone IUD), and surgical interventions were discussed, and the patient would like to proceed with surgical intervention.  She will be scheduled for a ROBOTIC ASSISTED TOTAL LAPAROSCOPIC HYSTERECTOMY WITH BILATERAL SALPINGO OOPHORECTOMY, CYSTOSCOPY AND OTHER INDICATED PROCEDURES    The pros, cons, risks, benefits, alternatives, and indications of the surgical procedure were discussed in detail with  the patient.  We discussed the possibility of allergic reactions, bleeding, infection damage to surrounding structures (cervix, uterus, bladder, ureters, bowel) and other abdominal pelvic organs.  Issues unique to this procedure were discussed.  We discussed the patient's longstanding androgen issues and the potential for no change resolution or worsening of hormonal type problems.    The patient's questions regarding above were answered and she agrees with this plan.  The patient was provided with the informed consent form and given times reviewed the form.  Questions were answered and consent was obtained    The patient's questions regarding the above were answered and she is in agreement with the current plan.    Giovanny Natarajan MD  Department OBGYN  [facility]

## 2024-01-02 NOTE — H&P (VIEW-ONLY)
Subjective:   Chief Complaint:  No chief complaint on file.       Patient ID: Keshia Son is a  68 y.o. female.    Date: 2024     The patient is status post: Hysteroscopy, dilation curettage  on 2023.  Surgery Indication(s):  Thickened endometrial cavity on pelvic ultrasound    From a postoperative standpoint she is currently doing well and without complaints.    She denies any postoperative fevers.   No significant postoperative GI or urologic issues.    No significant postoperative pain.   No significant postoperative bleeding.     Pathology: DIAGNOSIS:     ENDOMETRIUM, CURETTAGE:   - ENDOMETRIAL POLYP WITH SIMPLE (NON-ATYPICAL) HYPERPLASIA   - CHRONIC FOLLICULAR LYMPHOPLASMACYTIC ENDOMETRITIS   - NEGATIVE FOR DYSPLASIA OR MALIGNANCY         GYN & OB History  No LMP recorded. Patient is postmenopausal.     Date of Last Pap: No result found    OB History    Para Term  AB Living   0 0 0 0 0 0   SAB IAB Ectopic Multiple Live Births   0 0 0 0 0       Past Medical History:   Diagnosis Date    Abnormal Pap smear of cervix     Allergies     Androgen excess     History    Polycystic ovaries         Past Surgical History:   Procedure Laterality Date    ABDOMINOPLASTY  2021    BREAST REVISION SURGERY  2021    DILATION AND CURETTAGE OF UTERUS      GASTRIC RESTRICTION SURGERY      [facility]    HYSTEROSCOPY WITH DILATION AND CURETTAGE OF UTERUS N/A 2023    Procedure: HYSTEROSCOPY, WITH DILATION AND CURETTAGE OF UTERUS and other indicated procedures;  Surgeon: Giovanny Natarajan MD;  Location: [facility];  Service: OB/GYN;  Laterality: N/A;    REDUCTION OF BOTH BREASTS  2010    THIGH LIFT          TOTAL BODY LIFT              Review of Systems  Review of Systems   Constitutional:  Negative for fever.        As per HPI, otherwise no significant postoperative unexpected issues are noted   Respiratory: Negative.  Negative for shortness of breath.    Cardiovascular:   Negative for chest pain and palpitations.   Gastrointestinal:  Negative for abdominal pain, constipation, nausea and vomiting.   Genitourinary:  Negative for dysuria and hematuria.   Neurological: Negative.         Objective:      Vitals:    01/02/24 1608   BP: 130/78     Physical Exam:   Constitutional: She appears well-developed and well-nourished.    HENT:   Head: Normocephalic.     Neck: No thyroid mass present.    Cardiovascular:  Normal rate.             Pulmonary/Chest: Effort normal. No respiratory distress.        Abdominal: Soft. There is no abdominal tenderness.             Musculoskeletal: Normal range of motion.      Right lower leg: No edema.      Left lower leg: No edema.       Neurological: She is alert.   No gross lesions noted.    Skin: Skin is warm and dry.    Psychiatric: She has a normal mood and affect. Her speech is normal and behavior is normal. Mood normal.          Assessment:        1. Endometrial hyperplasia without atypia          Plan:      Endometrial hyperplasia without atypia       Follow up for As needed or 2 weeks following surgery.     The above was reviewed discussed with the patient.  We discussed the findings that time of surgery as well as pathology.  We reviewed the photographic images.    From a postoperative standpoint the patient is currently doing well.    The above was reviewed and discussed with the patient.    We discussed the significance of endometrial hyperplasia without atypia as well as hyperplasia with atypia..    Conservative, medical (oral progesterone and the progesterone IUD), and surgical interventions were discussed, and the patient would like to proceed with surgical intervention.  She will be scheduled for a ROBOTIC ASSISTED TOTAL LAPAROSCOPIC HYSTERECTOMY WITH BILATERAL SALPINGO OOPHORECTOMY, CYSTOSCOPY AND OTHER INDICATED PROCEDURES    The pros, cons, risks, benefits, alternatives, and indications of the surgical procedure were discussed in detail with  the patient.  We discussed the possibility of allergic reactions, bleeding, infection damage to surrounding structures (cervix, uterus, bladder, ureters, bowel) and other abdominal pelvic organs.  Issues unique to this procedure were discussed.  We discussed the patient's longstanding androgen issues and the potential for no change resolution or worsening of hormonal type problems.    The patient's questions regarding above were answered and she agrees with this plan.  The patient was provided with the informed consent form and given times reviewed the form.  Questions were answered and consent was obtained    The patient's questions regarding the above were answered and she is in agreement with the current plan.    Giovanny Natarajan MD  Department OBGYN  [facility]

## 2024-01-04 ENCOUNTER — TELEPHONE (OUTPATIENT)
Dept: OBSTETRICS AND GYNECOLOGY | Facility: CLINIC | Age: 69
End: 2024-01-04
Payer: MEDICARE

## 2024-01-04 NOTE — TELEPHONE ENCOUNTER
Contacted pt and notified of procedure date of 01/19. Pre-op and post-op appts scheduled. Questions answered and pt voiced understanding.

## 2024-01-16 ENCOUNTER — HOSPITAL ENCOUNTER (OUTPATIENT)
Dept: PREADMISSION TESTING | Facility: HOSPITAL | Age: 69
Discharge: HOME | End: 2024-01-16
Attending: OBSTETRICS & GYNECOLOGY
Payer: MEDICARE

## 2024-01-16 VITALS — BODY MASS INDEX: 34.37 KG/M2 | WEIGHT: 194 LBS

## 2024-01-16 DIAGNOSIS — Z01.818 PREOP TESTING: Primary | ICD-10-CM

## 2024-01-16 NOTE — DISCHARGE INSTRUCTIONS
To confirm, Your doctor has instructed you that surgery is scheduled for: 1/19/24 WITH DR. CASANOVA    Please report to [facility], Registration the morning of surgery. You must check-in and receive a wristband before going to your procedure.  [address] [place] [address] [place] [address]    Pre-Op will call the afternoon prior to surgery between 1:00 and 6:00 PM with the final arrival time.  Phone number: [phone]    PLEASE NOTE:  The surgery schedule has many variables which may affect the time of your surgery case.  Family members should be available if your surgery time changes.  Plan to be here the day of your procedure between 4-6 hours.    MEDICATIONS:  TAKE ONLY THESE MEDICATIONS WITH A SMALL SIP OF WATER THE MORNING OF YOUR PROCEDURE:  SEE LIST      DO NOT TAKE THESE MEDICATIONS 5-7 DAYS PRIOR to your procedure or per your surgeon's request:   ASPIRIN, ALEVE, ADVIL, IBUPROFEN, FISH OIL VITAMIN E, HERBALS  (May take Tylenol)    ONLY if you are prescribed any types of blood thinners such as:  Aspirin, Coumadin, Plavix, Pradaxa, Xarelto, Aggrenox, Effient, Eliquis, Savasya, Brilinta, or any other, ask your surgeon whether you should stop taking them and how long before surgery you should stop.  You may also need to verify with the prescribing physician if it is ok to stop your medication.      INSTRUCTIONS IMPORTANT!!  Do not eat or drink anything between midnight and the time of your procedure- this includes gum, mints, and candy.  Do not smoke or drink alcoholic beverages 24 hours prior to your procedure.  Shower the night before AND the morning of your procedure with a Chlorhexidine wash such as Hibiclens or Dial antibacterial soap from the neck down.  Do not get it on your face or in your eyes.  You may use your own shampoo and face wash. This helps your skin to be as bacteria free as possible.    If you wear contact lenses, dentures, hearing aids or glasses, bring a container to put  them in during surgery and give to a family member for safe keeping.  Please leave all jewelry, piercing's and valuables at home. You must remove your false eyelashes prior to surgery.    DO NOT remove hair from the surgery site.  Do not shave the incision site unless you are given specific instructions to do so.    ONLY if you have been diagnosed with sleep apnea please bring your C-PAP machine.  ONLY if you wear home oxygen please bring your portable oxygen tank the day of your procedure.  ONLY if you have a history of OPEN HEART SURGERY you will need a clearance from your Cardiologist per Anesthesia.      ONLY for patients requiring bowel prep, written instructions will be given by your doctor's office.  ONLY if you have a neuro stimulator, please bring the controller with you the morning of surgery  ONLY if a type and screen test is needed before surgery, please return:  If your doctor has scheduled you for an overnight stay, bring a small overnight bag with any personal items you need.  Make arrangements in advance for transportation home by a responsible adult.  It is not safe to drive a vehicle during the 24 hours after anesthesia.          All  facilities and properties are tobacco free.  Smoking is NOT allowed.   If you have any questions about these instructions, call Pre-Op Admit  Nursing at [phone] or the Pre-Op Day Surgery Unit at [phone].

## 2024-01-18 ENCOUNTER — ANESTHESIA EVENT (OUTPATIENT)
Dept: SURGERY | Facility: HOSPITAL | Age: 69
End: 2024-01-18
Payer: MEDICARE

## 2024-01-19 ENCOUNTER — ANESTHESIA (OUTPATIENT)
Dept: SURGERY | Facility: HOSPITAL | Age: 69
End: 2024-01-19
Payer: MEDICARE

## 2024-01-19 ENCOUNTER — HOSPITAL ENCOUNTER (OUTPATIENT)
Facility: HOSPITAL | Age: 69
Discharge: HOME | End: 2024-01-19
Attending: OBSTETRICS & GYNECOLOGY | Admitting: OBSTETRICS & GYNECOLOGY
Payer: MEDICARE

## 2024-01-19 DIAGNOSIS — Z01.818 PREOP TESTING: ICD-10-CM

## 2024-01-19 DIAGNOSIS — N85.00 ENDOMETRIAL HYPERPLASIA WITHOUT ATYPIA: ICD-10-CM

## 2024-01-19 PROCEDURE — 63600175 PHARM REV CODE 636 W HCPCS: Performed by: ANESTHESIOLOGY

## 2024-01-19 PROCEDURE — 71000033 HC RECOVERY, INTIAL HOUR: Performed by: OBSTETRICS & GYNECOLOGY

## 2024-01-19 PROCEDURE — 71000039 HC RECOVERY, EACH ADD'L HOUR: Performed by: OBSTETRICS & GYNECOLOGY

## 2024-01-19 PROCEDURE — 37000009 HC ANESTHESIA EA ADD 15 MINS: Performed by: OBSTETRICS & GYNECOLOGY

## 2024-01-19 PROCEDURE — 63600175 PHARM REV CODE 636 W HCPCS: Performed by: NURSE ANESTHETIST, CERTIFIED REGISTERED

## 2024-01-19 PROCEDURE — 36000712 HC OR TIME LEV V 1ST 15 MIN: Performed by: OBSTETRICS & GYNECOLOGY

## 2024-01-19 PROCEDURE — 36000713 HC OR TIME LEV V EA ADD 15 MIN: Performed by: OBSTETRICS & GYNECOLOGY

## 2024-01-19 PROCEDURE — 25000003 PHARM REV CODE 250: Performed by: ANESTHESIOLOGY

## 2024-01-19 PROCEDURE — 58571 TLH W/T/O 250 G OR LESS: CPT | Mod: ,,, | Performed by: OBSTETRICS & GYNECOLOGY

## 2024-01-19 PROCEDURE — 37000008 HC ANESTHESIA 1ST 15 MINUTES: Performed by: OBSTETRICS & GYNECOLOGY

## 2024-01-19 PROCEDURE — 94799 UNLISTED PULMONARY SVC/PX: CPT | Mod: XB

## 2024-01-19 PROCEDURE — 71000016 HC POSTOP RECOV ADDL HR: Performed by: OBSTETRICS & GYNECOLOGY

## 2024-01-19 PROCEDURE — 63600175 PHARM REV CODE 636 W HCPCS: Performed by: OBSTETRICS & GYNECOLOGY

## 2024-01-19 PROCEDURE — 25000003 PHARM REV CODE 250: Performed by: OBSTETRICS & GYNECOLOGY

## 2024-01-19 PROCEDURE — 63600175 PHARM REV CODE 636 W HCPCS: Mod: JZ,JG | Performed by: OBSTETRICS & GYNECOLOGY

## 2024-01-19 PROCEDURE — 27201423 OPTIME MED/SURG SUP & DEVICES STERILE SUPPLY: Performed by: OBSTETRICS & GYNECOLOGY

## 2024-01-19 PROCEDURE — 71000015 HC POSTOP RECOV 1ST HR: Performed by: OBSTETRICS & GYNECOLOGY

## 2024-01-19 PROCEDURE — 25000003 PHARM REV CODE 250: Performed by: NURSE ANESTHETIST, CERTIFIED REGISTERED

## 2024-01-19 PROCEDURE — 88307 TISSUE EXAM BY PATHOLOGIST: CPT | Mod: TC | Performed by: PATHOLOGY

## 2024-01-19 PROCEDURE — 99900035 HC TECH TIME PER 15 MIN (STAT)

## 2024-01-19 PROCEDURE — 27800903 OPTIME MED/SURG SUP & DEVICES OTHER IMPLANTS: Performed by: OBSTETRICS & GYNECOLOGY

## 2024-01-19 DEVICE — PATCH AMNION DUAL LAYER 4X8CM: Type: IMPLANTABLE DEVICE | Site: ABDOMEN | Status: FUNCTIONAL

## 2024-01-19 RX ORDER — CEFAZOLIN SODIUM 2 G/50ML
2 SOLUTION INTRAVENOUS
Status: COMPLETED | OUTPATIENT
Start: 2024-01-19 | End: 2024-01-19

## 2024-01-19 RX ORDER — PHENAZOPYRIDINE HYDROCHLORIDE 100 MG/1
200 TABLET, FILM COATED ORAL
Status: COMPLETED | OUTPATIENT
Start: 2024-01-19 | End: 2024-01-19

## 2024-01-19 RX ORDER — HYDROMORPHONE HYDROCHLORIDE 2 MG/ML
INJECTION, SOLUTION INTRAMUSCULAR; INTRAVENOUS; SUBCUTANEOUS
Status: DISCONTINUED | OUTPATIENT
Start: 2024-01-19 | End: 2024-01-19

## 2024-01-19 RX ORDER — MUPIROCIN 20 MG/G
OINTMENT TOPICAL
Status: DISCONTINUED | OUTPATIENT
Start: 2024-01-19 | End: 2024-01-19 | Stop reason: HOSPADM

## 2024-01-19 RX ORDER — SODIUM CHLORIDE 0.9 % (FLUSH) 0.9 %
3 SYRINGE (ML) INJECTION
Status: DISCONTINUED | OUTPATIENT
Start: 2024-01-19 | End: 2024-01-19 | Stop reason: HOSPADM

## 2024-01-19 RX ORDER — ONDANSETRON HYDROCHLORIDE 2 MG/ML
INJECTION, SOLUTION INTRAVENOUS
Status: DISCONTINUED | OUTPATIENT
Start: 2024-01-19 | End: 2024-01-19

## 2024-01-19 RX ORDER — LIDOCAINE HYDROCHLORIDE 10 MG/ML
1 INJECTION, SOLUTION EPIDURAL; INFILTRATION; INTRACAUDAL; PERINEURAL ONCE
Status: DISCONTINUED | OUTPATIENT
Start: 2024-01-19 | End: 2024-01-19 | Stop reason: HOSPADM

## 2024-01-19 RX ORDER — ROCURONIUM BROMIDE 10 MG/ML
INJECTION, SOLUTION INTRAVENOUS
Status: DISCONTINUED | OUTPATIENT
Start: 2024-01-19 | End: 2024-01-19

## 2024-01-19 RX ORDER — ACETAMINOPHEN 10 MG/ML
INJECTION, SOLUTION INTRAVENOUS
Status: DISCONTINUED | OUTPATIENT
Start: 2024-01-19 | End: 2024-01-19

## 2024-01-19 RX ORDER — OXYCODONE HYDROCHLORIDE 5 MG/1
5 TABLET ORAL
Status: DISCONTINUED | OUTPATIENT
Start: 2024-01-19 | End: 2024-01-19 | Stop reason: HOSPADM

## 2024-01-19 RX ORDER — IBUPROFEN 600 MG/1
600 TABLET ORAL EVERY 6 HOURS PRN
Qty: 30 TABLET | Refills: 0 | Status: SHIPPED | OUTPATIENT
Start: 2024-01-19 | End: 2024-05-29

## 2024-01-19 RX ORDER — OXYCODONE AND ACETAMINOPHEN 5; 325 MG/1; MG/1
1 TABLET ORAL EVERY 4 HOURS PRN
Qty: 12 TABLET | Refills: 0 | Status: SHIPPED | OUTPATIENT
Start: 2024-01-19 | End: 2024-05-29

## 2024-01-19 RX ORDER — SODIUM CHLORIDE 9 MG/ML
INJECTION, SOLUTION INTRAVENOUS CONTINUOUS
Status: DISCONTINUED | OUTPATIENT
Start: 2024-01-19 | End: 2024-01-19 | Stop reason: HOSPADM

## 2024-01-19 RX ORDER — BUPIVACAINE HYDROCHLORIDE 5 MG/ML
INJECTION, SOLUTION EPIDURAL; INTRACAUDAL
Status: DISCONTINUED | OUTPATIENT
Start: 2024-01-19 | End: 2024-01-19 | Stop reason: HOSPADM

## 2024-01-19 RX ORDER — DEXAMETHASONE SODIUM PHOSPHATE 4 MG/ML
INJECTION, SOLUTION INTRA-ARTICULAR; INTRALESIONAL; INTRAMUSCULAR; INTRAVENOUS; SOFT TISSUE
Status: DISCONTINUED | OUTPATIENT
Start: 2024-01-19 | End: 2024-01-19

## 2024-01-19 RX ORDER — OXYCODONE HYDROCHLORIDE 5 MG/1
5 TABLET ORAL ONCE
Status: COMPLETED | OUTPATIENT
Start: 2024-01-19 | End: 2024-01-19

## 2024-01-19 RX ORDER — LIDOCAINE HYDROCHLORIDE 20 MG/ML
INJECTION, SOLUTION EPIDURAL; INFILTRATION; INTRACAUDAL; PERINEURAL
Status: DISCONTINUED | OUTPATIENT
Start: 2024-01-19 | End: 2024-01-19

## 2024-01-19 RX ORDER — MIDAZOLAM HYDROCHLORIDE 1 MG/ML
INJECTION INTRAMUSCULAR; INTRAVENOUS
Status: DISCONTINUED | OUTPATIENT
Start: 2024-01-19 | End: 2024-01-19

## 2024-01-19 RX ORDER — ONDANSETRON HYDROCHLORIDE 2 MG/ML
4 INJECTION, SOLUTION INTRAVENOUS DAILY PRN
Status: DISCONTINUED | OUTPATIENT
Start: 2024-01-19 | End: 2024-01-19 | Stop reason: HOSPADM

## 2024-01-19 RX ORDER — ONDANSETRON 4 MG/1
4 TABLET, FILM COATED ORAL EVERY 6 HOURS PRN
Qty: 20 TABLET | Refills: 0 | Status: SHIPPED | OUTPATIENT
Start: 2024-01-19 | End: 2024-05-29

## 2024-01-19 RX ORDER — EPHEDRINE SULFATE 50 MG/ML
INJECTION, SOLUTION INTRAVENOUS
Status: DISCONTINUED | OUTPATIENT
Start: 2024-01-19 | End: 2024-01-19

## 2024-01-19 RX ORDER — FENTANYL CITRATE 50 UG/ML
INJECTION, SOLUTION INTRAMUSCULAR; INTRAVENOUS
Status: DISCONTINUED | OUTPATIENT
Start: 2024-01-19 | End: 2024-01-19

## 2024-01-19 RX ORDER — FENTANYL CITRATE 50 UG/ML
25 INJECTION, SOLUTION INTRAMUSCULAR; INTRAVENOUS EVERY 5 MIN PRN
Status: DISCONTINUED | OUTPATIENT
Start: 2024-01-19 | End: 2024-01-19 | Stop reason: HOSPADM

## 2024-01-19 RX ORDER — FENTANYL CITRATE 50 UG/ML
25 INJECTION, SOLUTION INTRAMUSCULAR; INTRAVENOUS EVERY 5 MIN PRN
Status: COMPLETED | OUTPATIENT
Start: 2024-01-19 | End: 2024-01-19

## 2024-01-19 RX ORDER — PROPOFOL 10 MG/ML
VIAL (ML) INTRAVENOUS
Status: DISCONTINUED | OUTPATIENT
Start: 2024-01-19 | End: 2024-01-19

## 2024-01-19 RX ADMIN — SUGAMMADEX 176 MG: 100 INJECTION, SOLUTION INTRAVENOUS at 08:01

## 2024-01-19 RX ADMIN — OXYCODONE HYDROCHLORIDE 5 MG: 5 TABLET ORAL at 09:01

## 2024-01-19 RX ADMIN — FENTANYL CITRATE 50 MCG: 50 INJECTION, SOLUTION INTRAMUSCULAR; INTRAVENOUS at 07:01

## 2024-01-19 RX ADMIN — ROCURONIUM BROMIDE 20 MG: 10 INJECTION, SOLUTION INTRAVENOUS at 07:01

## 2024-01-19 RX ADMIN — HYDROMORPHONE HYDROCHLORIDE 0.5 MG: 2 INJECTION INTRAMUSCULAR; INTRAVENOUS; SUBCUTANEOUS at 08:01

## 2024-01-19 RX ADMIN — DEXAMETHASONE SODIUM PHOSPHATE 4 MG: 4 INJECTION, SOLUTION INTRA-ARTICULAR; INTRALESIONAL; INTRAMUSCULAR; INTRAVENOUS; SOFT TISSUE at 07:01

## 2024-01-19 RX ADMIN — ONDANSETRON 4 MG: 2 INJECTION INTRAMUSCULAR; INTRAVENOUS at 07:01

## 2024-01-19 RX ADMIN — FENTANYL CITRATE 25 MCG: 50 INJECTION INTRAMUSCULAR; INTRAVENOUS at 09:01

## 2024-01-19 RX ADMIN — SODIUM CHLORIDE, SODIUM GLUCONATE, SODIUM ACETATE, POTASSIUM CHLORIDE AND MAGNESIUM CHLORIDE: 526; 502; 368; 37; 30 INJECTION, SOLUTION INTRAVENOUS at 06:01

## 2024-01-19 RX ADMIN — CEFAZOLIN SODIUM 2 G: 2 SOLUTION INTRAVENOUS at 07:01

## 2024-01-19 RX ADMIN — LIDOCAINE HYDROCHLORIDE 100 MG: 20 INJECTION, SOLUTION EPIDURAL; INFILTRATION; INTRACAUDAL at 07:01

## 2024-01-19 RX ADMIN — SODIUM CHLORIDE, SODIUM GLUCONATE, SODIUM ACETATE, POTASSIUM CHLORIDE AND MAGNESIUM CHLORIDE: 526; 502; 368; 37; 30 INJECTION, SOLUTION INTRAVENOUS at 09:01

## 2024-01-19 RX ADMIN — GLYCOPYRROLATE 0.2 MG: 0.2 INJECTION, SOLUTION INTRAMUSCULAR; INTRAVITREAL at 07:01

## 2024-01-19 RX ADMIN — FENTANYL CITRATE 25 MCG: 50 INJECTION INTRAMUSCULAR; INTRAVENOUS at 08:01

## 2024-01-19 RX ADMIN — ROCURONIUM BROMIDE 30 MG: 10 INJECTION, SOLUTION INTRAVENOUS at 07:01

## 2024-01-19 RX ADMIN — OXYCODONE HYDROCHLORIDE 5 MG: 5 TABLET ORAL at 08:01

## 2024-01-19 RX ADMIN — PROPOFOL 150 MG: 10 INJECTION, EMULSION INTRAVENOUS at 07:01

## 2024-01-19 RX ADMIN — PHENAZOPYRIDINE HYDROCHLORIDE 200 MG: 100 TABLET ORAL at 06:01

## 2024-01-19 RX ADMIN — MIDAZOLAM HYDROCHLORIDE 2 MG: 1 INJECTION, SOLUTION INTRAMUSCULAR; INTRAVENOUS at 06:01

## 2024-01-19 RX ADMIN — EPHEDRINE SULFATE 5 MG: 50 INJECTION, SOLUTION INTRAMUSCULAR; INTRAVENOUS; SUBCUTANEOUS at 07:01

## 2024-01-19 RX ADMIN — ACETAMINOPHEN 1000 MG: 10 INJECTION, SOLUTION INTRAVENOUS at 07:01

## 2024-01-19 NOTE — PLAN OF CARE
Pt voiding spontaneously without difficulty. Pt tolerating PO fluids and crackers. Pt's pain tolerable at this time. Pt agreeable to going home. Pt and family given discharge instructions. Prescriptions sent to pt's pharmacy. Educated on post anesthesia precautions, dressing care, and when to notify MD. All belongings returned to pt. Transferred to personal vehicle via wheelchair.

## 2024-01-19 NOTE — OP NOTE
[facility]  Department of Obstetrics & Gynecology  Operative Note      PATIENT NAME: Keshia Son    MRN: [id number]  TODAY'S DATE: 01/19/2024  ADMIT DATE: 1/19/2024                              OPERATIVE REPORT:  1/19/2024    SURGEON: Giovanny Natarajan M.D., MAXIME    ASSISTANT: PERLA Holley    PREOPERATIVE DIAGNOSIS:   1. Endometrial hyperplasia without atypia    POSTOPERATIVE DIAGNOSIS:   1. Endometrial hyperplasia without atypia    OPERATION:   1.  Robotic Assisted Total Laparoscopic Hysterectomy   2.  Bilateral salpingo-oophorectomy  3.  Placement of EvoLogic Amnion allograph   4.  Cystoscopy.     ANESTHESIA: General.    ESTIMATED BLOOD LOSS: 50 cc.     URINE OUTPUT: 120 cc    IV FLUIDS: 800 cc Crystalloid    FINDINGS:   No significant abnormalities of the upper abdomen or liver were noted.   The uterus was normal in appearance.   The right ovary and fallopian tube were normal in appearance.   The left ovary and fallopian tube were normal in appearance.   On cystoscopy, no abnormalities of the bladder were noted.  Clear urine was visualized from both the right and left ureteral orifices.     PATHOLOGY:  Cervix, uterus, right fallopian tube and ovary and left fallopian tube and ovary        PROCEDURE IN DETAIL:     The patient was initially seen in the preoperative area.  The pros, cons, risks, benefits, alternatives, and indications of the procedure reviewed and discussed.  The patient's questions were answered at this time and she is in agreement with the surgical plan.    The patient was taken to the operating room, where general endotracheal anesthesia was initiated. The patient was prepped and draped in the usual sterile fashion in the dorsal lithotomy position in the [facility].     A safety / time-out procedure was now performed.  The patient received preoperative antibiotics and sequential compression hose were in place.    A Adams catheter was placed into the bladder.  The cervix  and uterus were now evaluated, and an appropriate size KARLIE uterine manipulator was transfixed to the cervix and uterus in the usual fashion.     Attention was then turned to the abdomen.  The abdomen was evaluated for appropriate placement sites of the trocars.  At this time 0.5% plain Marcaine was injected at all trocar sites prior to the incisions.  A 10-mm skin incision was made sharply with the scalpel (the patient had previously undergone abdominal plasty with incision placed at the approximate site of a normal umbilicus as no umbilicus was present).  A Veress needle was placed with appropriate placement confirmed using a syringe and normal saline in the free-flow and negative pressure methods.  At this time an appropriate operative pneumoperitoneum was obtained.  An 8mm bladeless trocar was introduced through this incision, under direct visualization with the 5 mm laparoscope.  Following placement of the umbilical trocar an additional 2 lateral trocars were placed to aid in robotic surgery as well as a 4th left upper trocar for use by the assistant.    The robot was now docked in the usual fashion and instruments (the vessel sealer for the surgeon's right hand and the bipolar fenestrated grasper for the left hand) were passed into the abdominal cavity under direct laparoscopic visualization.     The pelvis, including the right and left ureters, were now evaluated.     Attention was turned to the right adnexa.  With the aid of the vessel sealer the right infundibulopelvic ligament was isolated and grasped.  Cautery was applied and it was transected at an appropriate distance away from the ovary.  The location of the right ureter had previously been noted/evaluated.  This process of clamp, cauterize and transect was carried along the pelvic wall, broad ligament, round ligament and cardinal ligaments up to the cervical vaginal junction.  This was performed from an anterior approach to avoid issues with the  ureter.  At the level of the uterine artery, the uterine artery was skeletonized and direct cautery clamping and transection was performed noting good hemostasis.    Attention was now turned to the left adnexa.  In a similar fashion, the vessel sealer was used to grasp the infundibulopelvic ligament.  Cautery was applied and it was transected in a similar fashion to the right infundibulopelvic ligament..  This process of clamp, cauterize and transect was once again carried along the pelvic wall, broad ligament, round ligament and cardinal ligaments up to the cervical vaginal junction.  Again, and anterior approach was used to avoid issues with the ureter.  At the level of the uterine artery, the left uterine artery was skeletonized and direct cautery, clamping and transection was performed noting good hemostasis.    The vessel sealer was now exchanged with the monopolar scissors.  The anterior cul-de-sac was evaluated.  With the use of sharp dissection the bladder, without the use of cautery, the bladder was dissected free from the underlying structures.  With the bladder free and clear of the operative site, an anterior colporrhaphy was made with visualization of the cervical cup.  The colporrhaphy was continued circumferentially into the posterior cul-de-sac and ultimately the cervix was excised free from the vaginal tissue.    With transection of the cervix from the vaginal vault, the uterus, cervix, fallopian tubes and ovaries were now delivered through the vaginal vault.    The pelvis was irrigated.  The robotic scissors were replaced with the robotic needle .  The vaginal cuff was now closed with running V-Loc 0 absorbable suture. The pelvis was irrigated and good hemostasis was noted.    At this time an EvoLogic Amnion 4 x 8 cm allograph was placed along the vaginal cuff and surrounding tissue.  This was performed to aid in prevention of adhesions as well as improve tissue healing.    While in the  preoperative area, the patient received oral Pyridium.  The Adams catheter was now removed and cystoscopy was performed in the usual fashion with the cystoscope.  Pyridium stained urine was noted from both ureteral orifices.  The bladder was evaluated and no significant abnormalities were noted.  This was confirmed by OR staff. The cystoscope was removed.  The Adams was not replaced.     The robot was undocked.  The operative pneumoperitoneum was now released.  The trocars removed without difficulty.  The skin incisions were closed with 4-0 Vicryl in a subcuticular fashion followed by skin glue.     Final count was reported as correct per nursing.  The patient tolerated the procedure well.    Giovanny Natarajan M.D., FACOG

## 2024-01-19 NOTE — ANESTHESIA POSTPROCEDURE EVALUATION
Anesthesia Post Evaluation    Patient: Keshia Son    Procedure(s) Performed: Procedure(s) (LRB):  ROBOTIC HYSTERECTOMY,WITH SALPINGO-OOPHORECTOMY (Bilateral)    Final Anesthesia Type: general      Patient location during evaluation: PACU  Patient participation: Yes- Able to Participate  Level of consciousness: awake and alert  Post-procedure vital signs: reviewed and stable  Pain management: adequate  Airway patency: patent    PONV status at discharge: No PONV  Anesthetic complications: no      Cardiovascular status: blood pressure returned to baseline  Respiratory status: unassisted and room air  Hydration status: euvolemic  Follow-up not needed.              Vitals Value Taken Time   /79 01/19/24 1012   Temp 36.4 °C (97.5 °F) 01/19/24 0935   Pulse 63 01/19/24 1012   Resp 18 01/19/24 1012   SpO2 95 % 01/19/24 1012         Event Time   Out of Recovery 09:45:00         Pain/Ana M Score: Pain Rating Prior to Med Admin: 5 (1/19/2024  9:34 AM)  Pain Rating Post Med Admin: 4 (1/19/2024  9:40 AM)  Ana M Score: 10 (1/19/2024  9:45 AM)  Modified Ana M Score: 19 (1/19/2024  9:45 AM)

## 2024-01-19 NOTE — PLAN OF CARE
1045: Pt awake in bed. Tolerating PO fluids and crackers. Having some cramping and gas pains. Pt requesting to walk around. Still due to void.

## 2024-01-19 NOTE — PLAN OF CARE
Pre-op complete. No one at bedside. Text notifications initiated. Pt denies need for safe. Belongings in postop.

## 2024-01-19 NOTE — ANESTHESIA PROCEDURE NOTES
Intubation    Date/Time: 1/19/2024 7:07 AM    Performed by: Magda Gomes CRNA  Authorized by: Chen Castillo MD    Intubation:     Induction:  Intravenous    Intubated:  Postinduction    Mask Ventilation:  Easy mask    Attempts:  1    Attempted By:  CRNA    Method of Intubation:  Video laryngoscopy    Blade:  Gaspar 3    Laryngeal View Grade: Grade I - full view of cords      Difficult Airway Encountered?: No      Complications:  None    Airway Device:  Oral endotracheal tube    Airway Device Size:  7.0    Style/Cuff Inflation:  Cuffed (inflated to minimal occlusive pressure)    Tube secured:  20    Secured at:  The lips    Placement Verified By:  Capnometry    Complicating Factors:  None    Findings Post-Intubation:  BS equal bilateral and atraumatic/condition of teeth unchanged

## 2024-01-19 NOTE — INTERVAL H&P NOTE
The patient has been examined and the H&P has been reviewed:    I concur with the findings and no changes have occurred since H&P was written.    Surgery risks, benefits and alternative options discussed and understood by patient/family.    Assessment:  1.            Endometrial hyperplasia without atypia     Plan: ROBOTIC ASSISTED TOTAL LAPAROSCOPIC HYSTERECTOMY WITH BILATERAL SALPINGO OOPHORECTOMY, CYSTOSCOPY AND OTHER INDICATED PROCEDURES      There are no hospital problems to display for this patient.

## 2024-01-19 NOTE — ANESTHESIA PREPROCEDURE EVALUATION
01/19/2024  Keshia Son is a 68 y.o., female.      Pre-op Assessment    I have reviewed the Patient Summary Reports.     I have reviewed the Nursing Notes.       Review of Systems  Anesthesia Hx:  No problems with previous Anesthesia                Cardiovascular:  Cardiovascular Normal                                            Pulmonary:  Pulmonary Normal                           Physical Exam  General: Well nourished    Airway:  Mallampati: II   Mouth Opening: Normal  TM Distance: Normal  Neck ROM: Normal ROM    Chest/Lungs:  Normal Respiratory Rate    Heart:  Rate: Normal  Rhythm: Regular Rhythm        Anesthesia Plan  Type of Anesthesia, risks & benefits discussed:    Anesthesia Type: Gen ETT  Intra-op Monitoring Plan: Standard ASA Monitors  Post Op Pain Control Plan: multimodal analgesia and IV/PO Opioids PRN  Induction:  IV  Airway Plan: Video and Direct  Informed Consent: Informed consent signed with the Patient and all parties understand the risks and agree with anesthesia plan.  All questions answered. Patient consented to blood products? No  ASA Score: 2  Day of Surgery Review of History & Physical: H&P Update referred to the surgeon/provider.    Ready For Surgery From Anesthesia Perspective.     .

## 2024-01-19 NOTE — TRANSFER OF CARE
"Anesthesia Transfer of Care Note    Patient: Keshia Son    Procedure(s) Performed: Procedure(s) (LRB):  ROBOTIC HYSTERECTOMY,WITH SALPINGO-OOPHORECTOMY (Bilateral)    Patient location: PACU    Anesthesia Type: general    Transport from OR: Transported from OR on 6-10 L/min O2 by face mask with adequate spontaneous ventilation    Post pain: adequate analgesia    Post assessment: no apparent anesthetic complications and tolerated procedure well    Post vital signs: stable    Level of consciousness: awake and responds to stimulation    Nausea/Vomiting: no nausea/vomiting    Complications: none    Transfer of care protocol was followed      Last vitals: Visit Vitals  /69 (BP Location: Right arm, Patient Position: Lying)   Pulse 67   Temp 36.5 °C (97.7 °F) (Skin)   Resp 18   Ht 5' 3" (1.6 m)   Wt 88 kg (194 lb)   SpO2 98%   Breastfeeding No   BMI 34.37 kg/m²     "

## 2024-01-19 NOTE — PROGRESS NOTES
Criteria per anesthesia for transfer to post op . Tolerating po fluids Pain addressed with po and IV analgesics Transferred per stretcher to phase 2 Report given to Britni

## 2024-01-19 NOTE — DISCHARGE INSTRUCTIONS
POSTOPERATIVE INSTRUCTIONS FOLLOWING MINIMALLY INVASIVE SURGERY: ROBOTIC HYSTERECTOMY & LAPAROSCOPY  Wound Care  If you had minimally invasive surgery you will most likely have a few small incisions on your abdomen.  These were closed with a dissolvable suture and covered with either skin glue or a bandage.  Once the glue or bandage starts to curl you can remove them gently in the shower.  It is okay to shower daily and clean the incision sites with a gentle soap and warm water.  Make sure to clean and dry them after you shower.  Do not take a tub bath until you have been seen for your initial postoperative evaluation.  Activity  Remember, you have just had surgery so try to get plenty of rest.    Avoid any heavy lifting following surgery.  The best way to  this is - If you have to make an effort lifting an object, do not lift it.  For the first 2 weeks following surgery, until you are seen for your postoperative evaluation, decreased activity is recommended.  Rest, but do not only stay in bed.  You should move around frequently, sit in chairs couch etc...  Moving and walking around will decrease the risk of blood clots in your legs.  Climbing stairs is okay but try to limit too much exertion.  Pelvic rest, nothing in the vagina, for at least 4 weeks following hysterectomy / 2 weeks following laparoscopic surgery is recommended.  This includes intercourse.  No driving until you have been seen for your postoperative evaluation at approximately 2 weeks.  Pain Management  You will be discharged on a prescription strength ibuprofen, usually 600 mg.  You can take 1 every 6 hours but no more than a total of 2400 mg in a 24-hour period.  In addition, a prescription for a narcotic/acetaminophen pain medication, such as Percocet, will be given.  You may take 1 or 2 tablets every 4 hours as needed for breakthrough pain (pain which is not responding to the ibuprofen).  If you are using the narcotic acetaminophen (usually  Percocet) pain medication, due NOT USE ADDITIONAL MEDICATIONS THAT CONTAIN ACETAMINOPHEN SUCH AS TYLENOL.  Most narcotic pain medications can cause constipation.  Drink lots of water and eat fruits, vegetables and whole grain foods.  Over-the-counter medications such as stool softeners, an example is Colace, as well as milk of magnesia may be used if necessary.  Issues to notify the office about   Fevers (temperature > 100.4) and chills  Significant abdominal pelvic pain unresponsive to the pain medications  Nausea and vomiting  Difficulty urinating or burning when you urinate  Foul drainage from the incision sites or the vagina  In the event you note SHORTNESS OF BREATH OR CHEST PAIN please proceed immediately to the NEAREST EMERGENCY ROOM OR CALL 911  Other  In most cases, you can resume a regular diet once you get home  You will usually be seen 2 weeks and once again 6 weeks following surgery if there are no postoperative issues.  You will be seen sooner or more often if necessary.  Remember if you have any questions or concerns you can always call the office or send a message via the [facility] patient portal.    Giovanny Natarajan MD  [facility] [place], [place]  [phone]      ********************    Post op instructions for prevention of DVT  What is deep vein thrombosis?  Deep vein thrombosis (DVT) is the medical term for blood clots in the deep veins of the leg.  These blood clots can be dangerous.  A DVT can block a blood vessel and keep blood from getting where it needs to go.  Another problem is that the clot can travel to other parts of the body such as the lungs.  A clot that travels to the lungs is called a pulmonary embolus (PE) and can cause serious problems with breathing which can lead to death.  Am I at risk for DVT/PE?  If you are not very active, you are at risk of DVT.  Anyone confined to bed, sitting for long periods of time, recovering from surgery, etc. increases the risk of  DVT.  Other risk factors are cancer diagnosis, certain medications, estrogen replacement in any form,older age, obesity, pregnancy, smoking, history of clotting disorders, and dehydration.  How will I know if I have a DVT?  Swelling in the lower leg  Pain  Warmth, redness, hardness or bulging of the vein  If you have any of these symptoms, call your doctors office right away.  Some people will not have any symptoms until the clot moves to the lungs.  What are the symptoms of a PE?  Panting, shortness of breath, or trouble breathing  Sharp, knife-like chest pain when you breathe  Coughing or coughing up blood  Rapid heartbeat  If you have any of these symptoms or get worse quickly, call 911 for emergency treatment.  How can I prevent a DVT?  Avoid long periods of inactivity and dont cross your legs--get up and walk around every hour or so.  Stay active--walking after surgery is highly encouraged.  This means you should get out of the house and walk in the neighborhood.  Going up and down stairs will not impair healing (unless advised against such activity by your doctor).    Drink plenty of noncaffeinated, nonalcoholic fluids each day to prevent dehydration.  Wear special support stockings, if they have been advised by your doctor.  If you travel, stop at least once an hour and walk around.  Avoid smoking (assistance with stopping is available through your healthcare provider)  Always notify your doctor if you are not able to follow the post operative instructions that are given to you at the time of discharge.  It may be necessary to prescribe one of the medications available to prevent DVT.      Using an Incentive Spirometer    An incentive spirometer is a device that helps you do deep breathing exercises. These exercises expand your lungs, aid in circulation, and help prevent pneumonia. Deep breathing exercises also help you breathe better and improve the function of your lungs by:  Keeping your lungs  clear  Strengthening your breathing muscles  Helping prevent respiratory complications or problems  The incentive spirometer gives you a way to take an active part in recover. A nurse or therapist will teach you breathing exercises. To do these exercises, you will breathe in through your mouth and not your nose. The incentive spirometer only works correctly if you breathe in through your mouth.  Steps to clear lungs  Step 1. Exhale normally. Then, inhale normally.  Relax and breathe out.  Step 2. Place your lips tightly around the mouthpiece.  Make sure the device is upright and not tilted.  Step 3. Inhale as much air as you can through the mouthpiece (don't breath through your nose).  Inhale slowly and deeply.  Hold your breath long enough to keep the balls or disk raised for at least 3 to 5 seconds, or as instructed by your healthcare provider.  Some spirometers have an indicator to let you know that you are breathing in too fast. If the indicator goes off, breathe in more slowly.  Step 4. Repeat the exercise regularly.  Do this exercise every hour while you're awake, or as instructed by your healthcare provider.  If you were taught deep breathing and coughing exercises, do them regularly as instructed by your healthcare provider.     We hope your stay was comfortable as you heal now, mend and rest.    For we have enjoyed taking care of you by giving your our best.    And as you get better, by regaining your health and strength;   We count it as a privilege to have served you and hope your time at [facility] was well spent.      Thank  You!!!

## 2024-01-22 VITALS
WEIGHT: 194 LBS | DIASTOLIC BLOOD PRESSURE: 79 MMHG | HEIGHT: 63 IN | SYSTOLIC BLOOD PRESSURE: 126 MMHG | BODY MASS INDEX: 34.38 KG/M2 | TEMPERATURE: 98 F | RESPIRATION RATE: 16 BRPM | OXYGEN SATURATION: 95 % | HEART RATE: 69 BPM

## 2024-01-31 ENCOUNTER — LAB VISIT (OUTPATIENT)
Dept: LAB | Facility: HOSPITAL | Age: 69
End: 2024-01-31
Attending: OBSTETRICS & GYNECOLOGY
Payer: MEDICARE

## 2024-01-31 ENCOUNTER — OFFICE VISIT (OUTPATIENT)
Dept: OBSTETRICS AND GYNECOLOGY | Facility: CLINIC | Age: 69
End: 2024-01-31
Payer: MEDICARE

## 2024-01-31 VITALS
DIASTOLIC BLOOD PRESSURE: 66 MMHG | BODY MASS INDEX: 34.38 KG/M2 | SYSTOLIC BLOOD PRESSURE: 132 MMHG | WEIGHT: 194 LBS | HEIGHT: 63 IN

## 2024-01-31 DIAGNOSIS — Z98.890 POSTOPERATIVE STATE: Primary | ICD-10-CM

## 2024-01-31 DIAGNOSIS — N85.00 ENDOMETRIAL HYPERPLASIA WITHOUT ATYPIA: ICD-10-CM

## 2024-01-31 DIAGNOSIS — E34.9 HORMONAL DISORDER: ICD-10-CM

## 2024-01-31 DIAGNOSIS — E28.1 ANDROGEN EXCESS: ICD-10-CM

## 2024-01-31 PROBLEM — R93.5 ABNORMAL ULTRASOUND OF ENDOMETRIUM: Status: RESOLVED | Noted: 2023-12-11 | Resolved: 2024-01-31

## 2024-01-31 LAB
ESTRADIOL SERPL-MCNC: <10 PG/ML
FSH SERPL-ACNC: 41.9 MIU/ML
PROGEST SERPL-MCNC: 0.2 NG/ML

## 2024-01-31 PROCEDURE — 82040 ASSAY OF SERUM ALBUMIN: CPT | Performed by: OBSTETRICS & GYNECOLOGY

## 2024-01-31 PROCEDURE — 83001 ASSAY OF GONADOTROPIN (FSH): CPT | Performed by: OBSTETRICS & GYNECOLOGY

## 2024-01-31 PROCEDURE — 99024 POSTOP FOLLOW-UP VISIT: CPT | Mod: POP,,, | Performed by: OBSTETRICS & GYNECOLOGY

## 2024-01-31 PROCEDURE — 99999 PR PBB SHADOW E&M-EST. PATIENT-LVL II: CPT | Mod: PBBFAC,,, | Performed by: OBSTETRICS & GYNECOLOGY

## 2024-01-31 PROCEDURE — 99212 OFFICE O/P EST SF 10 MIN: CPT | Mod: PBBFAC,PO | Performed by: OBSTETRICS & GYNECOLOGY

## 2024-01-31 PROCEDURE — 84144 ASSAY OF PROGESTERONE: CPT | Performed by: OBSTETRICS & GYNECOLOGY

## 2024-01-31 PROCEDURE — 82670 ASSAY OF TOTAL ESTRADIOL: CPT | Performed by: OBSTETRICS & GYNECOLOGY

## 2024-01-31 PROCEDURE — 36415 COLL VENOUS BLD VENIPUNCTURE: CPT | Mod: PO | Performed by: OBSTETRICS & GYNECOLOGY

## 2024-01-31 RX ORDER — PROGESTERONE 100 MG/1
100 CAPSULE ORAL NIGHTLY
Qty: 90 CAPSULE | Refills: 3 | Status: SHIPPED | OUTPATIENT
Start: 2024-01-31 | End: 2025-01-30

## 2024-01-31 NOTE — PROGRESS NOTES
Subjective:   Chief Complaint:  Post-op Evaluation ([facility] )       Patient ID: Keshia Son is a  68 y.o. female.    Date: 2024     The patient is status post: Robotic Hysterectomy with Bilateral salpingo oophorectomy on 2024.  Surgery Indication(s):  Endometrial hyperplasia    From a postoperative standpoint she is currently doing well.  Her only complaints involve menopausal type symptoms.    She denies any postoperative fevers.   No significant postoperative GI or urologic issues.    No significant postoperative pain.  No significant postoperative bleeding.     Pathology:    UTERUS, BILATERAL TUBES AND OVARIES:    CERVIX:   - ATROPHIC MUCOSAL CHANGES.   - CHRONIC CERVICITIS.   - NEGATIVE FOR DYSPLASIA OR MALIGNANCY.      ENDOMETRIUM:   - INCIPIENT ENDOMETRIAL POLYP WITH CYSTIC GLANDULAR CHANGES, NEGATIVE    FOR ATYPICAL ENDOMETRIAL HYPERPLASIA.   - BACKGROUND ENDOMETRIUM WITH CYSTIC ATROPHIC.   - NEGATIVE FOR MALIGNANCY.      MYOMETRIUM:   - NO HISTOPATHOLOGIC ABNORMALITY.     RIGHT AND LEFT OVARIES:   - NO HISTOPATHOLOGIC ABNORMALITY.     RIGHT AND LEFT FALLOPIAN TUBES:   - NO HISTOPATHOLOGIC ABNORMALITY.         GYN & OB History  No LMP recorded. Patient has had a hysterectomy.     Date of Last Pap: No result found    OB History    Para Term  AB Living   0 0 0 0 0 0   SAB IAB Ectopic Multiple Live Births   0 0 0 0 0       Past Medical History:   Diagnosis Date    Abnormal Pap smear of cervix     Allergies     Androgen excess     History    Polycystic ovaries         Past Surgical History:   Procedure Laterality Date    ABDOMINOPLASTY  2021    BREAST REVISION SURGERY  2021    DILATION AND CURETTAGE OF UTERUS      GASTRIC RESTRICTION SURGERY      Darryl    HYSTERECTOMY      HYSTEROSCOPY WITH DILATION AND CURETTAGE OF UTERUS N/A 2023    Procedure: HYSTEROSCOPY, WITH DILATION AND CURETTAGE OF UTERUS and other indicated procedures;  Surgeon: Giovanny Natarajan  MD;  Location: [facility] OR;  Service: OB/GYN;  Laterality: N/A;    REDUCTION OF BOTH BREASTS  2010    ROBOTIC HYSTERECTOMY, WITH SALPINGO-OOPHORECTOMY Bilateral 01/19/2024    Procedure: ROBOTIC HYSTERECTOMY,WITH SALPINGO-OOPHORECTOMY;  Surgeon: Giovanny Natarajan MD;  Location: [facility] OR;  Service: OB/GYN;  Laterality: Bilateral;    THIGH LIFT      2010    TOTAL BODY LIFT      2007        Review of Systems  Review of Systems   Constitutional:  Negative for fever.        As per HPI, otherwise no significant postoperative unexpected issues are noted   Respiratory: Negative.  Negative for shortness of breath.    Cardiovascular:  Negative for chest pain and palpitations.   Gastrointestinal:  Negative for abdominal pain, constipation, nausea and vomiting.   Genitourinary:  Negative for dysuria and hematuria.   Neurological: Negative.            Objective:      Vitals:    01/31/24 1346   BP: 132/66     Physical Exam:   Constitutional: She is oriented to person, place, and time. She appears well-developed and well-nourished.    HENT:   Head: Normocephalic.      Cardiovascular:  Normal rate and regular rhythm.             Pulmonary/Chest: Effort normal. No respiratory distress.        Abdominal: Soft. There is no abdominal tenderness. No hernia.   The laparoscopic incision sites appear to be healing well.  No drainage erythema or signs of infection.             Musculoskeletal:      Right lower leg: No edema.      Left lower leg: No edema.       Neurological: She is alert and oriented to person, place, and time.    Skin: Skin is warm and dry.    Psychiatric: She has a normal mood and affect. Mood normal.          Assessment:        1. Postoperative state    2. Hormonal disorder    3. Endometrial hyperplasia without atypia    4. Androgen excess          Plan:      Postoperative state    Hormonal disorder  -     Follicle Stimulating Hormone; Future; Expected date: 01/31/2024  -     Testosterone Panel; Future; Expected date:  01/31/2024  -     Estradiol; Future; Expected date: 01/31/2024  -     PROGESTERONE; Future; Expected date: 01/31/2024  -     progesterone (PROMETRIUM) 100 MG capsule; Take 1 capsule (100 mg total) by mouth nightly.  Dispense: 90 capsule; Refill: 3    Endometrial hyperplasia without atypia    Androgen excess      Follow up in about 4 weeks (around 2/28/2024) for Follow-up on today's evaluation, as needed / for any GYN related issues.     The above was reviewed discussed with the patient.  We discussed the findings that time of surgery as well as pathology.  We reviewed the photographic images.    From a postoperative standpoint the patient is currently doing well.    We discussed the patient's menopausal issues and at this time blood for FSH testosterone estradiol have been ordered.  The patient requests a prescription for progesterone which she feels aid in her sleep.  She will initiate this medication after obtaining the blood work as above.    We will base further evaluation treatment on the patient's status over time results of her laboratory evaluation.    The patient's questions regarding the above were answered and she is in agreement with the current plan.    Giovanny Natarajan MD  Department OBGYN  [facility]

## 2024-02-06 DIAGNOSIS — Z12.11 COLON CANCER SCREENING: ICD-10-CM

## 2024-02-15 LAB
ALBUMIN SERPL-MCNC: 4.3 G/DL (ref 3.6–5.1)
SHBG SERPL-SCNC: 36 NMOL/L (ref 14–73)
TESTOST FREE SERPL-MCNC: 1.1 PG/ML (ref 0.2–5)
TESTOST SERPL-MCNC: 10 NG/DL (ref 2–45)
TESTOSTERONE.FREE+WB SERPL-MCNC: 2.2 NG/DL (ref 0.5–8.5)

## 2024-02-29 ENCOUNTER — OFFICE VISIT (OUTPATIENT)
Dept: OBSTETRICS AND GYNECOLOGY | Facility: CLINIC | Age: 69
End: 2024-02-29
Payer: MEDICARE

## 2024-02-29 VITALS
BODY MASS INDEX: 34.02 KG/M2 | WEIGHT: 192 LBS | DIASTOLIC BLOOD PRESSURE: 64 MMHG | SYSTOLIC BLOOD PRESSURE: 116 MMHG | HEIGHT: 63 IN

## 2024-02-29 DIAGNOSIS — Z98.890 POSTOPERATIVE STATE: Primary | ICD-10-CM

## 2024-02-29 DIAGNOSIS — N95.1 MENOPAUSAL SYMPTOMS: ICD-10-CM

## 2024-02-29 DIAGNOSIS — N85.00 ENDOMETRIAL HYPERPLASIA WITHOUT ATYPIA: ICD-10-CM

## 2024-02-29 DIAGNOSIS — N95.2 ATROPHIC VAGINITIS: ICD-10-CM

## 2024-02-29 PROCEDURE — 99999 PR PBB SHADOW E&M-EST. PATIENT-LVL III: CPT | Mod: PBBFAC,,, | Performed by: OBSTETRICS & GYNECOLOGY

## 2024-02-29 PROCEDURE — 99024 POSTOP FOLLOW-UP VISIT: CPT | Mod: POP,,, | Performed by: OBSTETRICS & GYNECOLOGY

## 2024-02-29 PROCEDURE — 99213 OFFICE O/P EST LOW 20 MIN: CPT | Mod: PBBFAC,PO | Performed by: OBSTETRICS & GYNECOLOGY

## 2024-02-29 RX ORDER — ESTRADIOL 1 MG/1
1 TABLET ORAL DAILY
Qty: 90 TABLET | Refills: 3 | Status: SHIPPED | OUTPATIENT
Start: 2024-02-29 | End: 2024-04-22 | Stop reason: SDUPTHER

## 2024-02-29 NOTE — PROGRESS NOTES
Subjective:   Chief Complaint:  Post-op Evaluation ([facility] )       Patient ID: Keshia Son is a  68 y.o. female.    Date: 2024     The patient is status post: Robotic Hysterectomy with Bilateral salpingo oophorectomy on 2024.  Surgery Indication(s):  Endometrial hyperplasia    Pathology:    UTERUS, BILATERAL TUBES AND OVARIES:    CERVIX:   - ATROPHIC MUCOSAL CHANGES.   - CHRONIC CERVICITIS.   - NEGATIVE FOR DYSPLASIA OR MALIGNANCY.      ENDOMETRIUM:   - INCIPIENT ENDOMETRIAL POLYP WITH CYSTIC GLANDULAR CHANGES, NEGATIVE    FOR ATYPICAL ENDOMETRIAL HYPERPLASIA.   - BACKGROUND ENDOMETRIUM WITH CYSTIC ATROPHIC.   - NEGATIVE FOR MALIGNANCY.      MYOMETRIUM:   - NO HISTOPATHOLOGIC ABNORMALITY.     RIGHT AND LEFT OVARIES:   - NO HISTOPATHOLOGIC ABNORMALITY.     RIGHT AND LEFT FALLOPIAN TUBES:   - NO HISTOPATHOLOGIC ABNORMALITY.         From a postoperative standpoint she is currently doing well.      She is currently on progesterone but over time has noted increasing issues with hot flashes and would like to discuss initiating estrogen therapy.    She denies any postoperative fevers.   No significant postoperative GI or urologic issues.    No significant postoperative pain.  No significant postoperative bleeding.   GYN & OB History  No LMP recorded. Patient has had a hysterectomy.     Date of Last Pap: No result found    OB History    Para Term  AB Living   0 0 0 0 0 0   SAB IAB Ectopic Multiple Live Births   0 0 0 0 0       Past Medical History:   Diagnosis Date    Abnormal Pap smear of cervix     Allergies     Androgen excess     History    Polycystic ovaries         Past Surgical History:   Procedure Laterality Date    ABDOMINOPLASTY  2021    BREAST REVISION SURGERY  2021    DILATION AND CURETTAGE OF UTERUS      GASTRIC RESTRICTION SURGERY      Darryl    HYSTERECTOMY      HYSTEROSCOPY WITH DILATION AND CURETTAGE OF UTERUS N/A 2023    Procedure: HYSTEROSCOPY,  WITH DILATION AND CURETTAGE OF UTERUS and other indicated procedures;  Surgeon: Giovanny Natarajan MD;  Location: [facility] OR;  Service: OB/GYN;  Laterality: N/A;    REDUCTION OF BOTH BREASTS  2010    ROBOTIC HYSTERECTOMY, WITH SALPINGO-OOPHORECTOMY Bilateral 01/19/2024    Procedure: ROBOTIC HYSTERECTOMY,WITH SALPINGO-OOPHORECTOMY;  Surgeon: Giovanny Natarajan MD;  Location: [facility] OR;  Service: OB/GYN;  Laterality: Bilateral;    THIGH LIFT      2010    TOTAL BODY LIFT      2007        Review of Systems  Review of Systems   Constitutional:  Negative for fever.        As per HPI, otherwise no significant postoperative unexpected issues are noted   Respiratory: Negative.  Negative for shortness of breath.    Cardiovascular:  Negative for chest pain and palpitations.   Gastrointestinal:  Negative for abdominal pain, constipation, nausea and vomiting.   Genitourinary:  Negative for dysuria and hematuria.   Neurological: Negative.            Objective:      Vitals:    02/29/24 1401   BP: 116/64     Physical Exam:   Constitutional: She is oriented to person, place, and time. She appears well-developed and well-nourished.    HENT:   Head: Normocephalic.     Neck: No thyroid mass present.    Cardiovascular:  Normal rate and regular rhythm.             Pulmonary/Chest: Effort normal. No respiratory distress.        Abdominal: Soft. There is no abdominal tenderness. No hernia.   The laparoscopic incision sites appear to be healing well.  No drainage erythema or signs of infection.     Genitourinary:    Inguinal canal, right adnexa and left adnexa normal.      Pelvic exam was performed with patient supine.   The external female genitalia was normal.   No external genitalia lesions identified,Right adnexum displays no tenderness and no fullness. Left adnexum displays no tenderness and no fullness. There is erythema in the vagina. No tenderness or bleeding in the vagina. Vaginal atrophy noted. Cervix is absent.Uterus is absent. Normal  urethral meatus.Urethra findings: no tendernessBladder findings: no bladder tenderness   Genitourinary Comments: A chaperone (female medical assistant) was present throughout the physical exam.             Musculoskeletal:      Right lower leg: No edema.      Left lower leg: No edema.      Lymphadenopathy:     She has no cervical adenopathy. No inguinal adenopathy noted on the right or left side.    Neurological: She is alert and oriented to person, place, and time.    Skin: Skin is warm and dry.    Psychiatric: She has a normal mood and affect. Mood normal.        Results    Collected Updated Procedure    01/31/2024 1423 01/31/2024 2147 PROGESTERONE [[id number]]   Blood    Component Value Units   Progesterone 0.2  ng/mL          01/31/2024 1423 01/31/2024 2147 Estradiol [[id number]]   (Abnormal)   Blood    Component Value Units   Estradiol <10 Abnormal   pg/mL          01/31/2024 1423 02/15/2024 0940 Testosterone Panel [[id number]]   Blood    Component Value Units   Testosterone 10  ng/dL   Testosterone, Free 1.1  pg/mL   Testosterone, Bioavailable 2.2  ng/dL   Sex Hormone Binding Globulin 36  nmol/L   Albumin 4.3  g/dL          01/31/2024 1423 01/31/2024 2147 Follicle Stimulating Hormone [[id number]]   Blood    Component Value Units   Follicle Stimulating Hormone 41.90  mIU/mL        Assessment:        1. Postoperative state    2. Endometrial hyperplasia without atypia    3. Menopausal symptoms    4. Atrophic vaginitis      Plan:      Postoperative state    Endometrial hyperplasia without atypia    Menopausal symptoms  -     estradioL (ESTRACE) 1 MG tablet; Take 1 tablet (1 mg total) by mouth once daily.  Dispense: 90 tablet; Refill: 3    Atrophic vaginitis      Follow up in about 3 months (around 5/29/2024) for Follow-up on today's evaluation, as needed / for any GYN related issues.     The above was reviewed discussed with the patient.  We once again discussed the findings that time of surgery as well as  pathology.     From a postoperative standpoint, with the exception of menopausal issues, the patient is currently doing well.    We reviewed the results of her recent lab work which confirmed a menopausal state but noted a significant improvement in her testosterone.  Options reviewed and in addition to progesterone the patient will now be started on Estrace 1 mg daily.  The pros, cons, risks, benefits, alternatives and indications of the medication(s) prescribed, as well as appropriate use and potential side effects were discussed.  Oncologic and cardiovascular issues associated hormone replacement therapy were discussed.    The patient's questions regarding the above were answered and she is in agreement with the current plan.    Giovanny Natarajan MD  Department OBGYN  [facility]

## 2024-03-12 ENCOUNTER — PATIENT MESSAGE (OUTPATIENT)
Dept: FAMILY MEDICINE | Facility: CLINIC | Age: 69
End: 2024-03-12
Payer: MEDICARE

## 2024-03-14 ENCOUNTER — PATIENT MESSAGE (OUTPATIENT)
Dept: OBSTETRICS AND GYNECOLOGY | Facility: CLINIC | Age: 69
End: 2024-03-14
Payer: MEDICARE

## 2024-03-15 ENCOUNTER — PATIENT MESSAGE (OUTPATIENT)
Dept: OBSTETRICS AND GYNECOLOGY | Facility: CLINIC | Age: 69
End: 2024-03-15
Payer: MEDICARE

## 2024-04-22 DIAGNOSIS — N95.1 MENOPAUSAL SYMPTOMS: ICD-10-CM

## 2024-04-22 RX ORDER — ESTRADIOL 1 MG/1
1 TABLET ORAL DAILY
Qty: 90 TABLET | Refills: 3 | Status: SHIPPED | OUTPATIENT
Start: 2024-04-22 | End: 2024-04-22

## 2024-04-22 RX ORDER — ESTRADIOL 1 MG/1
1 TABLET ORAL
Qty: 90 TABLET | Refills: 3 | Status: SHIPPED | OUTPATIENT
Start: 2024-04-22 | End: 2024-04-26 | Stop reason: SDUPTHER

## 2024-04-24 ENCOUNTER — TELEPHONE (OUTPATIENT)
Dept: OBSTETRICS AND GYNECOLOGY | Facility: CLINIC | Age: 69
End: 2024-04-24
Payer: MEDICARE

## 2024-04-24 NOTE — TELEPHONE ENCOUNTER
----- Message from Car Seth sent at 4/23/2024 11:41 AM CDT -----  Type: Needs Medical Advice  Who Called:  pt  Symptoms (please be specific):  pt said she need to speak to the nurse--said it's about a rx that the provider changed--is all the info pt left--please call and advise  Best Call Back Number: [phone] (home)     Additional Information: thank you

## 2024-04-25 ENCOUNTER — PATIENT MESSAGE (OUTPATIENT)
Dept: OBSTETRICS AND GYNECOLOGY | Facility: CLINIC | Age: 69
End: 2024-04-25
Payer: MEDICARE

## 2024-04-25 DIAGNOSIS — N95.1 MENOPAUSAL SYMPTOMS: ICD-10-CM

## 2024-04-26 RX ORDER — ESTRADIOL 1 MG/1
1.5 TABLET ORAL DAILY
Qty: 90 TABLET | Refills: 0 | Status: SHIPPED | OUTPATIENT
Start: 2024-04-26 | End: 2024-05-29 | Stop reason: SDUPTHER

## 2024-05-29 ENCOUNTER — OFFICE VISIT (OUTPATIENT)
Dept: OBSTETRICS AND GYNECOLOGY | Facility: CLINIC | Age: 69
End: 2024-05-29
Payer: MEDICARE

## 2024-05-29 VITALS
BODY MASS INDEX: 35 KG/M2 | DIASTOLIC BLOOD PRESSURE: 71 MMHG | HEART RATE: 61 BPM | SYSTOLIC BLOOD PRESSURE: 128 MMHG | WEIGHT: 197.56 LBS | HEIGHT: 63 IN

## 2024-05-29 DIAGNOSIS — N95.1 MENOPAUSAL SYMPTOMS: ICD-10-CM

## 2024-05-29 PROBLEM — E28.1 ANDROGEN EXCESS: Status: RESOLVED | Noted: 2023-12-11 | Resolved: 2024-05-29

## 2024-05-29 PROCEDURE — 99999 PR PBB SHADOW E&M-EST. PATIENT-LVL III: CPT | Mod: PBBFAC,,, | Performed by: OBSTETRICS & GYNECOLOGY

## 2024-05-29 PROCEDURE — 99213 OFFICE O/P EST LOW 20 MIN: CPT | Mod: PBBFAC,PO | Performed by: OBSTETRICS & GYNECOLOGY

## 2024-05-29 PROCEDURE — 99213 OFFICE O/P EST LOW 20 MIN: CPT | Mod: S$PBB,,, | Performed by: OBSTETRICS & GYNECOLOGY

## 2024-05-29 RX ORDER — ESTRADIOL 2 MG/1
2 TABLET ORAL DAILY
Qty: 90 TABLET | Refills: 3 | Status: SHIPPED | OUTPATIENT
Start: 2024-05-29 | End: 2025-05-29

## 2024-05-29 NOTE — Clinical Note
Hello.  If possible please see this mutual patient for evaluation.  She recently underwent hysterectomy and since that time has had a number of issues / complaints including increased eating (no significant weight changes are noted in epic) and crying.  She has researched this and feels it is due to a lack of estrogen.  I recently raised her to 2 mg of estradiol daily but I am concerned that there other factors in place.  In the past she was seen by endocrinology but discontinued this as she did not care for the provider.  Thanks for your assistance.  SP

## 2024-05-29 NOTE — PROGRESS NOTES
"  Subjective:   Chief Complaint:  Follow-up (3 month)       Patient ID: Keshia Son is a  68 y.o. female.    HRT:  Micronized progesterone 100 mg and estradiol 1.5 mg    Date: 2024     The patient is status post: Robotic Hysterectomy with Bilateral salpingo oophorectomy on 2024.    Following her surgery she reported menopausal type issues which did not respond to traditional hormone replacement therapy.  She was on Prometrium 100 mg as well as estradiol 1 mg.  At her last evaluation her estrogen was increased to 1.5 mg.    She presents today for follow-up and reports continued issues with "overeating and crying".  She does not feel that these issues were present prior to hysterectomy and feels they are due to a lack of estrogen.    She reports clitoral irritation which she also feels is due to a lack of estrogen.      GYN & OB History  No LMP recorded. Patient has had a hysterectomy.     OB History    Para Term  AB Living   0 0 0 0 0 0   SAB IAB Ectopic Multiple Live Births   0 0 0 0 0       Past Medical History:   Diagnosis Date    Abnormal Pap smear of cervix     Allergies     Androgen excess     History    Polycystic ovaries         Past Surgical History:   Procedure Laterality Date    ABDOMINOPLASTY  2021    BREAST REVISION SURGERY  2021    DILATION AND CURETTAGE OF UTERUS      GASTRIC RESTRICTION SURGERY      [place]    HYSTERECTOMY      HYSTEROSCOPY WITH DILATION AND CURETTAGE OF UTERUS N/A 2023    Procedure: HYSTEROSCOPY, WITH DILATION AND CURETTAGE OF UTERUS and other indicated procedures;  Surgeon: Giovanny Natarajan MD;  Location: [facility] OR;  Service: OB/GYN;  Laterality: N/A;    REDUCTION OF BOTH BREASTS      ROBOTIC HYSTERECTOMY, WITH SALPINGO-OOPHORECTOMY Bilateral 2024    Procedure: ROBOTIC HYSTERECTOMY,WITH SALPINGO-OOPHORECTOMY;  Surgeon: Giovanny Natarajan MD;  Location: [facility] OR;  Service: OB/GYN;  Laterality: Bilateral;    THIGH LIFT      " 2010    TOTAL BODY LIFT      2007        Review of Systems  Review of Systems   Constitutional:  Positive for unexpected weight change (Concerns per patient). Negative for fever.   Respiratory: Negative.     Cardiovascular:  Negative for chest pain and palpitations.   Gastrointestinal:  Negative for nausea and vomiting.   Genitourinary:  Negative for dysuria, hematuria and urgency.        Gyn as per HPI   Neurological:  Negative for headaches.           Objective:      Vitals:    05/29/24 1341   BP: 128/71   Pulse: 61     Physical Exam:   Constitutional: She appears well-developed and well-nourished.    HENT:   Head: Normocephalic.     Neck: No thyroid mass present.    Cardiovascular:  Normal rate.             Pulmonary/Chest: Effort normal. No respiratory distress.        Abdominal: Soft. There is no abdominal tenderness.             Musculoskeletal: Normal range of motion.      Right lower leg: No edema.      Left lower leg: No edema.       Neurological: She is alert.   No gross lesions noted.    Skin: Skin is warm and dry.    Psychiatric: She has a normal mood and affect. Her speech is normal and behavior is normal. Mood normal.        Assessment:        1. Menopausal symptoms        Plan:      Menopausal symptoms  -     estradioL (ESTRACE) 2 MG tablet; Take 1 tablet (2 mg total) by mouth once daily.  Dispense: 90 tablet; Refill: 3    Follow up in about 3 months (around 8/29/2024) for Follow-up on today's evaluation, as needed / for any GYN related issues.     The above was reviewed discussed with the patient.  We reviewed her current hormone replacement therapy and her concerns.    We discussed the fact that these may or may not be related to menopause and alternative issues may be present.      At this time will proceed as follows:   The patient will remain on micronized progesterone 100 mg and will increase her estrogen to 2 mg daily.  The pros, cons, risks, benefits, alternatives and indications of the  medication(s) prescribed, as well as appropriate use and potential side effects were discussed.  Oncologic and cardiovascular issues associated hormone replacement therapy were discussed.    The patient's primary care physician will be notified and a request for evaluation from a general medical standpoint for this patient we will be made.  We will base further evaluation treatment on the patient's response to the increased estrogen dosage.      The patient's questions regarding the above were answered and she is in agreement with the current plan.    Giovanny Natarajan MD  Department OBGYN  [facility]

## 2024-07-09 ENCOUNTER — PATIENT MESSAGE (OUTPATIENT)
Dept: ADMINISTRATIVE | Facility: HOSPITAL | Age: 69
End: 2024-07-09
Payer: MEDICARE

## 2024-08-23 ENCOUNTER — TELEPHONE (OUTPATIENT)
Dept: OBSTETRICS AND GYNECOLOGY | Facility: CLINIC | Age: 69
End: 2024-08-23
Payer: MEDICARE

## 2024-08-28 ENCOUNTER — OFFICE VISIT (OUTPATIENT)
Dept: OBSTETRICS AND GYNECOLOGY | Facility: CLINIC | Age: 69
End: 2024-08-28
Payer: MEDICARE

## 2024-08-28 VITALS
WEIGHT: 200.81 LBS | HEIGHT: 63 IN | SYSTOLIC BLOOD PRESSURE: 140 MMHG | DIASTOLIC BLOOD PRESSURE: 70 MMHG | BODY MASS INDEX: 35.58 KG/M2

## 2024-08-28 DIAGNOSIS — Z78.0 MENOPAUSE: Primary | ICD-10-CM

## 2024-08-28 PROCEDURE — 99212 OFFICE O/P EST SF 10 MIN: CPT | Mod: PBBFAC,PO | Performed by: OBSTETRICS & GYNECOLOGY

## 2024-08-28 PROCEDURE — 99213 OFFICE O/P EST LOW 20 MIN: CPT | Mod: S$PBB,,, | Performed by: OBSTETRICS & GYNECOLOGY

## 2024-08-28 PROCEDURE — 99999 PR PBB SHADOW E&M-EST. PATIENT-LVL II: CPT | Mod: PBBFAC,,, | Performed by: OBSTETRICS & GYNECOLOGY

## 2024-08-28 NOTE — PROGRESS NOTES
"  Pertinent Med & GYN Problem List    Robotic Hysterectomy with Bilateral salpingo oophorectomy    Subjective:   Chief Complaint:  Follow-up (3 month follow up)       Patient ID: Keshia Son is a  69 y.o. female.    HRT:  Micronized progesterone 100 mg and estradiol 1.5 mg    Date: 2024     The patient is status post: Robotic Hysterectomy with Bilateral salpingo oophorectomy on 2024.    Following her surgery she reported menopausal type issues which did not respond to traditional hormone replacement therapy.  She was on Prometrium 100 mg as well as estradiol 1 mg.  At her last evaluation her estrogen was increased to 1.5 mg.    She presents today for follow-up and reports continued issues with "overeating and crying".  She does not feel that these issues were present prior to hysterectomy and feels they are due to a lack of estrogen.    She reports clitoral irritation which she also feels is due to a lack of estrogen.      Date: 2024    The patient presents today for follow-up.  She was last seen as above.  In light of the patient's issue she was increased to 2 mg of estradiol and 100 mg of micronized progesterone at her last evaluation.    She reports feeling significantly better both from a menopausal standpoint as well as from a psychological standpoint.    GYN & OB History  No LMP recorded. Patient has had a hysterectomy.     OB History    Para Term  AB Living   0 0 0 0 0 0   SAB IAB Ectopic Multiple Live Births   0 0 0 0 0       Past Medical History:   Diagnosis Date    Abnormal Pap smear of cervix     Allergies     Androgen excess     History    Polycystic ovaries         Past Surgical History:   Procedure Laterality Date    ABDOMINOPLASTY  2021    BREAST REVISION SURGERY  2021    DILATION AND CURETTAGE OF UTERUS      GASTRIC RESTRICTION SURGERY      [place]    HYSTERECTOMY      HYSTEROSCOPY WITH DILATION AND CURETTAGE OF UTERUS N/A 2023    Procedure: " HYSTEROSCOPY, WITH DILATION AND CURETTAGE OF UTERUS and other indicated procedures;  Surgeon: Giovanny Natarajan MD;  Location: [facility] OR;  Service: OB/GYN;  Laterality: N/A;    REDUCTION OF BOTH BREASTS  2010    ROBOTIC HYSTERECTOMY, WITH SALPINGO-OOPHORECTOMY Bilateral 01/19/2024    Procedure: ROBOTIC HYSTERECTOMY,WITH SALPINGO-OOPHORECTOMY;  Surgeon: Giovanny Natarajan MD;  Location: [facility] OR;  Service: OB/GYN;  Laterality: Bilateral;    THIGH LIFT      2010    TOTAL BODY LIFT      2007        Review of Systems  Review of Systems   Constitutional:  Positive for unexpected weight change (Concerns per patient). Negative for fever.   Respiratory: Negative.     Cardiovascular:  Negative for chest pain and palpitations.   Gastrointestinal:  Negative for nausea and vomiting.   Genitourinary:  Negative for dysuria, hematuria and urgency.        Gyn as per HPI   Neurological:  Negative for headaches.           Objective:      Vitals:    08/28/24 1433   BP: (!) 140/70     Physical Exam:   Constitutional: She appears well-developed and well-nourished.    HENT:   Head: Normocephalic.     Neck: No thyroid mass present.    Cardiovascular:  Normal rate.             Pulmonary/Chest: Effort normal. No respiratory distress.        Abdominal: Soft. There is no abdominal tenderness.             Musculoskeletal: Normal range of motion.      Right lower leg: No edema.      Left lower leg: No edema.       Neurological: She is alert.   No gross lesions noted.    Skin: Skin is warm and dry.    Psychiatric: She has a normal mood and affect. Her speech is normal and behavior is normal. Mood normal.        Assessment:        1. Menopause      Plan:      Menopause      Follow up in about 6 months (around 2/28/2025) for F/U on today's evaluation, as needed / for any GYN related issues.     The above was reviewed discussed with the patient.  At this time she appears to be doing well on her current hormone replacement therapy which is 2 mg of  estradiol and 100 mg of micronized progesterone.    At this time the patient will continue on this dose regimen and will base further evaluation treatment of the patient's status over time.    The pros, cons, risks, benefits, alternatives and indications of the medication(s) prescribed, as well as appropriate use and potential side effects were discussed.  Oncologic and cardiovascular issues associated hormone replacement therapy were discussed.    The patient's questions regarding the above were answered and she is in agreement with the current plan.    Giovanny Natarajan MD  Department OBN  [facility]

## 2024-10-23 ENCOUNTER — PATIENT MESSAGE (OUTPATIENT)
Dept: OBSTETRICS AND GYNECOLOGY | Facility: CLINIC | Age: 69
End: 2024-10-23
Payer: MEDICARE

## 2024-11-01 ENCOUNTER — TELEPHONE (OUTPATIENT)
Dept: FAMILY MEDICINE | Facility: CLINIC | Age: 69
End: 2024-11-01
Payer: MEDICARE

## 2025-02-24 DIAGNOSIS — Z00.00 ENCOUNTER FOR MEDICARE ANNUAL WELLNESS EXAM: ICD-10-CM

## 2025-03-03 ENCOUNTER — OFFICE VISIT (OUTPATIENT)
Dept: OBSTETRICS AND GYNECOLOGY | Facility: CLINIC | Age: 70
End: 2025-03-03
Payer: MEDICARE

## 2025-03-03 VITALS
BODY MASS INDEX: 33.16 KG/M2 | DIASTOLIC BLOOD PRESSURE: 66 MMHG | WEIGHT: 187.19 LBS | SYSTOLIC BLOOD PRESSURE: 112 MMHG

## 2025-03-03 DIAGNOSIS — R14.0 BLOATING: ICD-10-CM

## 2025-03-03 DIAGNOSIS — Z87.42 HISTORY OF PCOS: ICD-10-CM

## 2025-03-03 DIAGNOSIS — N95.1 MENOPAUSAL SYMPTOMS: Primary | ICD-10-CM

## 2025-03-03 PROCEDURE — 99214 OFFICE O/P EST MOD 30 MIN: CPT | Mod: S$PBB,,, | Performed by: OBSTETRICS & GYNECOLOGY

## 2025-03-03 PROCEDURE — 99213 OFFICE O/P EST LOW 20 MIN: CPT | Mod: PBBFAC,PO | Performed by: OBSTETRICS & GYNECOLOGY

## 2025-03-03 PROCEDURE — 99999 PR PBB SHADOW E&M-EST. PATIENT-LVL III: CPT | Mod: PBBFAC,,, | Performed by: OBSTETRICS & GYNECOLOGY

## 2025-03-03 RX ORDER — MEDROXYPROGESTERONE ACETATE 2.5 MG/1
10 TABLET ORAL DAILY
Qty: 360 TABLET | Refills: 3 | Status: SHIPPED | OUTPATIENT
Start: 2025-03-03 | End: 2026-03-03

## 2025-03-03 RX ORDER — ESTRADIOL 0.1 MG/D
1 PATCH TRANSDERMAL
Qty: 12 PATCH | Refills: 3 | Status: SHIPPED | OUTPATIENT
Start: 2025-03-03 | End: 2026-03-03

## 2025-03-03 NOTE — PROGRESS NOTES
"OBSTETRICS AND GYNECOLOGY OFFICE NOTE    Pertinent Med & GYN Problem List    Robotic Hysterectomy with Bilateral salpingo oophorectomy    Subjective:   Chief Complaint:  Follow-up (6 month f/u /Discuss HR levels )       Patient ID: Keshia Son is a  69 y.o. female.    HRT:  Micronized progesterone 100 mg and estradiol to mg    Date: 2025     The patient is status post: Robotic Hysterectomy with Bilateral salpingo oophorectomy on 2024.    She is currently on micronized progesterone 100 mg and estradiol 2 mg for menopausal issues.    She presents today as she would like to discuss her "hormone levels".  She reports some bloating which she feels a secondary to the progesterone and would like to change to a lower dose progesterone.    She continues to have concerns that she has an underlying endocrine issue and would like referral to endocrinology.  She denies any pelvic pain or vaginal bleeding.    GYN & OB History  No LMP recorded. Patient has had a hysterectomy.     OB History    Para Term  AB Living   0 0 0 0 0 0   SAB IAB Ectopic Multiple Live Births   0 0 0 0 0       Past Medical History:   Diagnosis Date    Abnormal Pap smear of cervix     Allergies     Androgen excess     History    Polycystic ovaries         Past Surgical History:   Procedure Laterality Date    ABDOMINOPLASTY  2021    BREAST REVISION SURGERY  2021    DILATION AND CURETTAGE OF UTERUS      GASTRIC RESTRICTION SURGERY      [place]    HYSTERECTOMY      HYSTEROSCOPY WITH DILATION AND CURETTAGE OF UTERUS N/A 2023    Procedure: HYSTEROSCOPY, WITH DILATION AND CURETTAGE OF UTERUS and other indicated procedures;  Surgeon: Giovanny Natarajan MD;  Location: [facility] OR;  Service: OB/GYN;  Laterality: N/A;    REDUCTION OF BOTH BREASTS      ROBOTIC HYSTERECTOMY, WITH SALPINGO-OOPHORECTOMY Bilateral 2024    Procedure: ROBOTIC HYSTERECTOMY,WITH SALPINGO-OOPHORECTOMY;  Surgeon: Giovanny Natarajan MD;  " Location: [facility] OR;  Service: OB/GYN;  Laterality: Bilateral;    THIGH LIFT      2010    TOTAL BODY LIFT      2007        Review of Systems  Review of Systems   Constitutional:  Positive for unexpected weight change (Concerns per patient). Negative for fever.   Respiratory: Negative.     Cardiovascular:  Negative for chest pain and palpitations.   Gastrointestinal:  Negative for nausea and vomiting.   Genitourinary:  Negative for dysuria, hematuria and urgency. Vaginal bleeding: pcos.       Gyn as per HPI   Neurological:  Negative for headaches.           Objective:      Vitals:    03/03/25 1453   BP: 112/66     Physical Exam:   Constitutional: She appears well-developed and well-nourished.    HENT:   Head: Normocephalic.     Neck: No thyroid mass present.    Cardiovascular:  Normal rate.             Pulmonary/Chest: Effort normal. No respiratory distress.        Abdominal: Soft. There is no abdominal tenderness.             Musculoskeletal: Normal range of motion.      Right lower leg: No edema.      Left lower leg: No edema.       Neurological: She is alert.   No gross lesions noted.    Skin: Skin is warm and dry.    Psychiatric: She has a normal mood and affect. Her speech is normal and behavior is normal. Mood normal.        Assessment:        1. Menopausal symptoms    2. Bloating    3. History of PCOS        Plan:      Menopausal symptoms  -     Ambulatory referral/consult to Endocrinology; Future; Expected date: 03/10/2025  -     Luteinizing Hormone; Future; Expected date: 03/03/2025  -     Follicle Stimulating Hormone; Future; Expected date: 03/03/2025  -     Testosterone Panel; Future; Expected date: 03/03/2025  -     Estradiol; Future; Expected date: 03/03/2025  -     Progesterone; Future; Expected date: 03/03/2025  -     medroxyPROGESTERone (PROVERA) 2.5 MG tablet; Take 4 tablets (10 mg total) by mouth once daily.  Dispense: 360 tablet; Refill: 3  -     estradiol 0.1 mg/24 hr td ptwk 0.1 mg/24 hr PTWK;  Place 1 patch onto the skin every 7 days.  Dispense: 12 patch; Refill: 3    Bloating  -     Ambulatory referral/consult to Gastroenterology; Future; Expected date: 03/10/2025    History of PCOS  -     Ambulatory referral/consult to Endocrinology; Future; Expected date: 03/10/2025        Follow up in about 3 months (around 6/3/2025) for F/U on today's evaluation, as needed / for any GYN related issues.     The above was reviewed discussed with the patient.    At this time we will proceed as follows:   Referral to endocrinology has been placed.    We will change the patient from 100 mg of micronized progesterone to 2.5 mg of medroxyprogesterone.  In addition we discussed the fact that bloating is primarily a GI type issue and as such a GI referral has been placed.  In addition we will switch from oral estrogen to 0.1 mg transdermal patch.  The pros, cons, risks, benefits, alternatives and indications of the medication(s) prescribed, as well as appropriate use and potential side effects were discussed.  We discussed issues and relevant risks associated with the medicine prescribed.  Oncologic and cardiovascular issues associated hormone replacement therapy were discussed.   We will base further evaluation and treatment of the patient's response to medical intervention, results of her lab work and recommendation by any consultants.    The patient's questions regarding the above were answered and she is in agreement with the current plan.    Giovanny Natarajan MD  Department OBGYN [facility]

## 2025-03-05 ENCOUNTER — LAB VISIT (OUTPATIENT)
Dept: LAB | Facility: HOSPITAL | Age: 70
End: 2025-03-05
Attending: OBSTETRICS & GYNECOLOGY
Payer: MEDICARE

## 2025-03-05 DIAGNOSIS — N95.1 MENOPAUSAL SYMPTOMS: ICD-10-CM

## 2025-03-05 PROCEDURE — 84144 ASSAY OF PROGESTERONE: CPT | Performed by: OBSTETRICS & GYNECOLOGY

## 2025-03-05 PROCEDURE — 84270 ASSAY OF SEX HORMONE GLOBUL: CPT | Performed by: OBSTETRICS & GYNECOLOGY

## 2025-03-05 PROCEDURE — 83002 ASSAY OF GONADOTROPIN (LH): CPT | Performed by: OBSTETRICS & GYNECOLOGY

## 2025-03-05 PROCEDURE — 83001 ASSAY OF GONADOTROPIN (FSH): CPT | Performed by: OBSTETRICS & GYNECOLOGY

## 2025-03-05 PROCEDURE — 36415 COLL VENOUS BLD VENIPUNCTURE: CPT | Mod: PO | Performed by: OBSTETRICS & GYNECOLOGY

## 2025-03-05 PROCEDURE — 82670 ASSAY OF TOTAL ESTRADIOL: CPT | Performed by: OBSTETRICS & GYNECOLOGY

## 2025-03-06 LAB
ESTRADIOL SERPL-MCNC: 93 PG/ML
FSH SERPL-ACNC: 14.38 MIU/ML
LH SERPL-ACNC: 11 MIU/ML
PROGEST SERPL-MCNC: 1.7 NG/ML

## 2025-03-07 ENCOUNTER — PATIENT MESSAGE (OUTPATIENT)
Dept: GASTROENTEROLOGY | Facility: CLINIC | Age: 70
End: 2025-03-07
Payer: MEDICARE

## 2025-03-11 ENCOUNTER — PATIENT MESSAGE (OUTPATIENT)
Dept: GASTROENTEROLOGY | Facility: CLINIC | Age: 70
End: 2025-03-11
Payer: MEDICARE

## 2025-03-12 ENCOUNTER — RESULTS FOLLOW-UP (OUTPATIENT)
Dept: OBSTETRICS AND GYNECOLOGY | Facility: CLINIC | Age: 70
End: 2025-03-12

## 2025-03-12 LAB
ALBUMIN SERPL-MCNC: 4.2 G/DL (ref 3.6–5.1)
SHBG SERPL-SCNC: 123 NMOL/L (ref 14–73)
TESTOST FREE SERPL-MCNC: 0.5 PG/ML (ref 0.2–5)
TESTOST SERPL-MCNC: 12 NG/DL (ref 2–45)
TESTOSTERONE.FREE+WB SERPL-MCNC: 0.9 NG/DL (ref 0.5–8.5)

## 2025-03-13 ENCOUNTER — PATIENT MESSAGE (OUTPATIENT)
Dept: GASTROENTEROLOGY | Facility: CLINIC | Age: 70
End: 2025-03-13
Payer: MEDICARE

## 2025-06-03 ENCOUNTER — OFFICE VISIT (OUTPATIENT)
Dept: OBSTETRICS AND GYNECOLOGY | Facility: CLINIC | Age: 70
End: 2025-06-03
Payer: MEDICARE

## 2025-06-03 VITALS
BODY MASS INDEX: 31.48 KG/M2 | DIASTOLIC BLOOD PRESSURE: 80 MMHG | SYSTOLIC BLOOD PRESSURE: 112 MMHG | WEIGHT: 177.69 LBS

## 2025-06-03 DIAGNOSIS — N95.1 MENOPAUSAL SYMPTOMS: Primary | ICD-10-CM

## 2025-06-03 PROCEDURE — 99213 OFFICE O/P EST LOW 20 MIN: CPT | Mod: S$PBB,,, | Performed by: OBSTETRICS & GYNECOLOGY

## 2025-06-03 PROCEDURE — 99212 OFFICE O/P EST SF 10 MIN: CPT | Mod: PBBFAC,PO | Performed by: OBSTETRICS & GYNECOLOGY

## 2025-06-03 PROCEDURE — 99999 PR PBB SHADOW E&M-EST. PATIENT-LVL II: CPT | Mod: PBBFAC,,, | Performed by: OBSTETRICS & GYNECOLOGY

## (undated) DEVICE — SCRUB HIBICLENS 4% CHG 4OZ

## (undated) DEVICE — SET CYSTO IRR DRP CHMBR 84IN

## (undated) DEVICE — ELECTRODE REM PLYHSV RETURN 9

## (undated) DEVICE — SYR 50CC LL

## (undated) DEVICE — LINER SUCTION 3000CC

## (undated) DEVICE — GOWN POLY REINF BRTH SLV XL

## (undated) DEVICE — PAD SANITARY OB STERILE

## (undated) DEVICE — PACK CUSTOM UNIV BASIN SLI

## (undated) DEVICE — PACK FLUENT DISPOSABLE

## (undated) DEVICE — APPLICATOR CHLORAPREP ORN 26ML

## (undated) DEVICE — GLOVE SENSICARE PI ALOE 7.5

## (undated) DEVICE — SOL NORMAL USPCA 0.9%

## (undated) DEVICE — IRRIGATOR SUCTION W/TIP

## (undated) DEVICE — DRAPE ARM DAVINCI XI

## (undated) DEVICE — TROCAR ENDO Z THREAD KII 5X100

## (undated) DEVICE — COVER PROXIMA MAYO STAND

## (undated) DEVICE — COVER SURG LIGHT HANDLE

## (undated) DEVICE — DEVICE MYOSURE REACH

## (undated) DEVICE — DRAPE UINDERBUT GRAD PCH

## (undated) DEVICE — SCISSOR 5MMX35CM DIRECT DRIVE

## (undated) DEVICE — BLADE SURG CARBON STEEL SZ11

## (undated) DEVICE — SOL NACL IRR 3000ML

## (undated) DEVICE — Device

## (undated) DEVICE — SOL CLEARIFY VISUALIZATION LAP

## (undated) DEVICE — SPONGE GAUZE 16PLY 4X4

## (undated) DEVICE — GLOVE BIOGEL PI MICRO INDIC 7

## (undated) DEVICE — SOL ELECTROLUBE ANTI-STIC

## (undated) DEVICE — GLOVE SENSICARE PI ALOE 7

## (undated) DEVICE — SEAL UNIVERSAL 5MM-8MM XI

## (undated) DEVICE — OBTURATOR BLADELESS 8MM XI CLR

## (undated) DEVICE — SOL NACL IRR 1000ML BTL

## (undated) DEVICE — LUBRICANT SURGILUBE 2 OZ

## (undated) DEVICE — COVER TIP CURVED SCISSORS XI

## (undated) DEVICE — DRAPE ABDOMINAL TIBURON 14X11

## (undated) DEVICE — SLEEVE SCD EXPRESS KNEE MEDIUM

## (undated) DEVICE — GOWN X-LG STERILE BACK

## (undated) DEVICE — TOWEL OR DISP STRL BLUE 4/PK

## (undated) DEVICE — SET TUBE PNEUMOCLEAR SE HI FLO

## (undated) DEVICE — LEGGING CLEAR POLY 2/PACK

## (undated) DEVICE — NDL SAFETY 21G X 1 1/2 ECLPSE

## (undated) DEVICE — SYR 10CC LUER LOCK

## (undated) DEVICE — GOWN POLY REINF X-LONG XL

## (undated) DEVICE — TRAY CATH FOL SIL URIMTR 16FR

## (undated) DEVICE — SUT MONOCRYL 4-0 PS-2

## (undated) DEVICE — TRAY SKIN SCRUB DRY PREMIUM

## (undated) DEVICE — SEAL LENS SCOPE MYOSURE

## (undated) DEVICE — PACK SIRUS BASIC V SURG STRL

## (undated) DEVICE — TIP RUMI BLUE DISPOSABLE 5/BX

## (undated) DEVICE — SEALER VESSEL EXTEND

## (undated) DEVICE — NDL PNEUMOPERITONEUM 150MM

## (undated) DEVICE — SYR ONLY LUER LOCK 20CC

## (undated) DEVICE — UNDERPAD ULTRASORB 300LB 30X36

## (undated) DEVICE — SUT 0 VICRYL / CT-1

## (undated) DEVICE — PAD OB HS-77 STRL PREPACK 12S

## (undated) DEVICE — SCRUB DYNA-HEX LIQ 4% CHG 4OZ

## (undated) DEVICE — TIP RUMI KOH-EFFICIENT 3.5

## (undated) DEVICE — GOWN POLY REINF BRTH SLV 2XL

## (undated) DEVICE — DRAPE COLUMN DAVINCI XI

## (undated) DEVICE — SOL IRR SOD CHL .9% POUR

## (undated) DEVICE — COVER TABLE 44X90 STERILE

## (undated) DEVICE — ADHESIVE DERMABOND ADVANCED

## (undated) DEVICE — CATH RED RUBBER LATEX 14F 16IN

## (undated) DEVICE — PAD PINK TRENDELENBURG POS XL

## (undated) DEVICE — TRAY DRY SPONGE SCRUB W FOAM

## (undated) DEVICE — GLOVE SENSICARE PI GRN 8